# Patient Record
Sex: MALE | Race: WHITE | Employment: FULL TIME | URBAN - METROPOLITAN AREA
[De-identification: names, ages, dates, MRNs, and addresses within clinical notes are randomized per-mention and may not be internally consistent; named-entity substitution may affect disease eponyms.]

---

## 2024-10-18 ENCOUNTER — APPOINTMENT (OUTPATIENT)
Dept: CT IMAGING | Age: 54
End: 2024-10-18
Payer: COMMERCIAL

## 2024-10-18 ENCOUNTER — HOSPITAL ENCOUNTER (EMERGENCY)
Age: 54
Discharge: ANOTHER HOSPITAL | End: 2024-10-20
Attending: EMERGENCY MEDICINE
Payer: COMMERCIAL

## 2024-10-18 ENCOUNTER — APPOINTMENT (OUTPATIENT)
Dept: GENERAL RADIOLOGY | Age: 54
End: 2024-10-18
Payer: COMMERCIAL

## 2024-10-18 DIAGNOSIS — I63.9 CEREBROVASCULAR ACCIDENT (CVA), UNSPECIFIED MECHANISM (HCC): Primary | ICD-10-CM

## 2024-10-18 PROBLEM — F17.200 SMOKER: Status: ACTIVE | Noted: 2024-10-18

## 2024-10-18 LAB
ANION GAP SERPL CALCULATED.3IONS-SCNC: 9 MMOL/L (ref 7–16)
BASOPHILS # BLD: 0.11 K/UL (ref 0–0.2)
BASOPHILS NFR BLD: 1 % (ref 0–2)
BUN SERPL-MCNC: 14 MG/DL (ref 6–20)
CALCIUM SERPL-MCNC: 9.7 MG/DL (ref 8.6–10.2)
CHLORIDE SERPL-SCNC: 93 MMOL/L (ref 98–107)
CO2 SERPL-SCNC: 25 MMOL/L (ref 22–29)
CREAT SERPL-MCNC: 1 MG/DL (ref 0.7–1.2)
EOSINOPHIL # BLD: 0.43 K/UL (ref 0.05–0.5)
EOSINOPHILS RELATIVE PERCENT: 3 % (ref 0–6)
ERYTHROCYTE [DISTWIDTH] IN BLOOD BY AUTOMATED COUNT: 12.6 % (ref 11.5–15)
GFR, ESTIMATED: >90 ML/MIN/1.73M2
GLUCOSE BLD-MCNC: 119 MG/DL (ref 74–99)
GLUCOSE SERPL-MCNC: 108 MG/DL (ref 74–99)
HCT VFR BLD AUTO: 40.8 % (ref 37–54)
HGB BLD-MCNC: 13.8 G/DL (ref 12.5–16.5)
IMM GRANULOCYTES # BLD AUTO: 0.05 K/UL (ref 0–0.58)
IMM GRANULOCYTES NFR BLD: 0 % (ref 0–5)
INR PPP: 1
LYMPHOCYTES NFR BLD: 4.7 K/UL (ref 1.5–4)
LYMPHOCYTES RELATIVE PERCENT: 36 % (ref 20–42)
MCH RBC QN AUTO: 30.1 PG (ref 26–35)
MCHC RBC AUTO-ENTMCNC: 33.8 G/DL (ref 32–34.5)
MCV RBC AUTO: 89.1 FL (ref 80–99.9)
MONOCYTES NFR BLD: 0.94 K/UL (ref 0.1–0.95)
MONOCYTES NFR BLD: 7 % (ref 2–12)
NEUTROPHILS NFR BLD: 52 % (ref 43–80)
NEUTS SEG NFR BLD: 6.71 K/UL (ref 1.8–7.3)
PLATELET # BLD AUTO: 288 K/UL (ref 130–450)
PMV BLD AUTO: 9.4 FL (ref 7–12)
POTASSIUM SERPL-SCNC: 3.8 MMOL/L (ref 3.5–5)
PROTHROMBIN TIME: 10.5 SEC (ref 9.3–12.4)
RBC # BLD AUTO: 4.58 M/UL (ref 3.8–5.8)
SODIUM SERPL-SCNC: 127 MMOL/L (ref 132–146)
TROPONIN I SERPL HS-MCNC: 7 NG/L (ref 0–11)
WBC OTHER # BLD: 12.9 K/UL (ref 4.5–11.5)

## 2024-10-18 PROCEDURE — 82962 GLUCOSE BLOOD TEST: CPT

## 2024-10-18 PROCEDURE — 0042T CT BRAIN PERFUSION: CPT

## 2024-10-18 PROCEDURE — 93005 ELECTROCARDIOGRAM TRACING: CPT | Performed by: EMERGENCY MEDICINE

## 2024-10-18 PROCEDURE — 99285 EMERGENCY DEPT VISIT HI MDM: CPT

## 2024-10-18 PROCEDURE — 70450 CT HEAD/BRAIN W/O DYE: CPT

## 2024-10-18 PROCEDURE — 6360000002 HC RX W HCPCS: Performed by: EMERGENCY MEDICINE

## 2024-10-18 PROCEDURE — 70498 CT ANGIOGRAPHY NECK: CPT

## 2024-10-18 PROCEDURE — 85025 COMPLETE CBC W/AUTO DIFF WBC: CPT

## 2024-10-18 PROCEDURE — 84484 ASSAY OF TROPONIN QUANT: CPT

## 2024-10-18 PROCEDURE — 6360000004 HC RX CONTRAST MEDICATION: Performed by: RADIOLOGY

## 2024-10-18 PROCEDURE — 80048 BASIC METABOLIC PNL TOTAL CA: CPT

## 2024-10-18 PROCEDURE — 85610 PROTHROMBIN TIME: CPT

## 2024-10-18 PROCEDURE — 99449 NTRPROF PH1/NTRNET/EHR 31/>: CPT | Performed by: PSYCHIATRY & NEUROLOGY

## 2024-10-18 PROCEDURE — 2580000003 HC RX 258: Performed by: EMERGENCY MEDICINE

## 2024-10-18 PROCEDURE — 37195 THROMBOLYTIC THERAPY STROKE: CPT

## 2024-10-18 PROCEDURE — 2580000003 HC RX 258

## 2024-10-18 PROCEDURE — 71045 X-RAY EXAM CHEST 1 VIEW: CPT

## 2024-10-18 PROCEDURE — 6360000002 HC RX W HCPCS

## 2024-10-18 PROCEDURE — 70496 CT ANGIOGRAPHY HEAD: CPT

## 2024-10-18 RX ORDER — SODIUM CHLORIDE 9 MG/ML
INJECTION, SOLUTION INTRAVENOUS PRN
Status: DISCONTINUED | OUTPATIENT
Start: 2024-10-18 | End: 2024-10-20 | Stop reason: HOSPADM

## 2024-10-18 RX ORDER — HYDRALAZINE HYDROCHLORIDE 20 MG/ML
10 INJECTION INTRAMUSCULAR; INTRAVENOUS EVERY 30 MIN PRN
Status: DISCONTINUED | OUTPATIENT
Start: 2024-10-18 | End: 2024-10-20 | Stop reason: HOSPADM

## 2024-10-18 RX ORDER — POTASSIUM CHLORIDE 7.45 MG/ML
10 INJECTION INTRAVENOUS PRN
Status: CANCELLED | OUTPATIENT
Start: 2024-10-18 | End: 2024-10-25

## 2024-10-18 RX ORDER — ACETAMINOPHEN 325 MG/1
650 TABLET ORAL EVERY 6 HOURS PRN
Status: CANCELLED | OUTPATIENT
Start: 2024-10-18

## 2024-10-18 RX ORDER — SODIUM CHLORIDE 0.9 % (FLUSH) 0.9 %
10 SYRINGE (ML) INJECTION EVERY 12 HOURS SCHEDULED
Status: CANCELLED | OUTPATIENT
Start: 2024-10-18

## 2024-10-18 RX ORDER — ONDANSETRON 4 MG/1
4 TABLET, ORALLY DISINTEGRATING ORAL EVERY 8 HOURS PRN
Status: CANCELLED | OUTPATIENT
Start: 2024-10-18

## 2024-10-18 RX ORDER — IOPAMIDOL 755 MG/ML
100 INJECTION, SOLUTION INTRAVASCULAR
Status: COMPLETED | OUTPATIENT
Start: 2024-10-18 | End: 2024-10-18

## 2024-10-18 RX ORDER — ONDANSETRON 2 MG/ML
4 INJECTION INTRAMUSCULAR; INTRAVENOUS EVERY 6 HOURS PRN
Status: CANCELLED | OUTPATIENT
Start: 2024-10-18

## 2024-10-18 RX ORDER — LORATADINE 10 MG/1
10 TABLET, ORALLY DISINTEGRATING ORAL DAILY
COMMUNITY

## 2024-10-18 RX ORDER — LABETALOL HYDROCHLORIDE 5 MG/ML
10 INJECTION, SOLUTION INTRAVENOUS EVERY 10 MIN PRN
Status: DISCONTINUED | OUTPATIENT
Start: 2024-10-18 | End: 2024-10-20 | Stop reason: HOSPADM

## 2024-10-18 RX ORDER — ACETAMINOPHEN 650 MG/1
650 SUPPOSITORY RECTAL EVERY 6 HOURS PRN
Status: CANCELLED | OUTPATIENT
Start: 2024-10-18

## 2024-10-18 RX ORDER — SODIUM CHLORIDE 0.9 % (FLUSH) 0.9 %
10 SYRINGE (ML) INJECTION PRN
Status: CANCELLED | OUTPATIENT
Start: 2024-10-18

## 2024-10-18 RX ORDER — SODIUM CHLORIDE 0.9 % (FLUSH) 0.9 %
5-40 SYRINGE (ML) INJECTION PRN
Status: DISCONTINUED | OUTPATIENT
Start: 2024-10-18 | End: 2024-10-20 | Stop reason: HOSPADM

## 2024-10-18 RX ORDER — NICOTINE 21 MG/24HR
1 PATCH, TRANSDERMAL 24 HOURS TRANSDERMAL DAILY
Status: CANCELLED | OUTPATIENT
Start: 2024-10-19

## 2024-10-18 RX ORDER — SODIUM CHLORIDE 0.9 % (FLUSH) 0.9 %
10 SYRINGE (ML) INJECTION ONCE
Status: COMPLETED | OUTPATIENT
Start: 2024-10-18 | End: 2024-10-18

## 2024-10-18 RX ORDER — MAGNESIUM SULFATE IN WATER 40 MG/ML
2000 INJECTION, SOLUTION INTRAVENOUS PRN
Status: CANCELLED | OUTPATIENT
Start: 2024-10-18

## 2024-10-18 RX ORDER — SODIUM CHLORIDE 9 MG/ML
INJECTION, SOLUTION INTRAVENOUS PRN
Status: CANCELLED | OUTPATIENT
Start: 2024-10-18

## 2024-10-18 RX ORDER — SENNOSIDES A AND B 8.6 MG/1
1 TABLET, FILM COATED ORAL DAILY PRN
Status: CANCELLED | OUTPATIENT
Start: 2024-10-18

## 2024-10-18 RX ORDER — POTASSIUM CHLORIDE 1500 MG/1
40 TABLET, EXTENDED RELEASE ORAL PRN
Status: CANCELLED | OUTPATIENT
Start: 2024-10-18 | End: 2024-10-25

## 2024-10-18 RX ORDER — SODIUM CHLORIDE 0.9 % (FLUSH) 0.9 %
5-40 SYRINGE (ML) INJECTION EVERY 12 HOURS SCHEDULED
Status: DISCONTINUED | OUTPATIENT
Start: 2024-10-18 | End: 2024-10-20 | Stop reason: HOSPADM

## 2024-10-18 RX ADMIN — SODIUM CHLORIDE, PRESERVATIVE FREE 10 ML: 5 INJECTION INTRAVENOUS at 22:09

## 2024-10-18 RX ADMIN — SODIUM CHLORIDE, PRESERVATIVE FREE 10 ML: 5 INJECTION INTRAVENOUS at 22:13

## 2024-10-18 RX ADMIN — IOPAMIDOL 100 ML: 755 INJECTION, SOLUTION INTRAVENOUS at 21:35

## 2024-10-18 RX ADMIN — Medication 17 MG: at 22:11

## 2024-10-18 ASSESSMENT — PAIN - FUNCTIONAL ASSESSMENT: PAIN_FUNCTIONAL_ASSESSMENT: NONE - DENIES PAIN

## 2024-10-18 ASSESSMENT — PAIN SCALES - GENERAL: PAINLEVEL_OUTOF10: 0

## 2024-10-19 ENCOUNTER — APPOINTMENT (OUTPATIENT)
Dept: MRI IMAGING | Age: 54
End: 2024-10-19
Payer: COMMERCIAL

## 2024-10-19 PROCEDURE — 70551 MRI BRAIN STEM W/O DYE: CPT

## 2024-10-19 NOTE — CARE COORDINATION
Internal Medicine On-call Care Coordination Note    I was called by the ED physician because they recommended admission for this patient and we cover their PCP.  The history as I understand it after discussion with the ED physician is as follows:    LNK 8:30 pm  R sided weakness, axaxia  NIH 5  Telestroke  Smoker  Giving TNK    I placed admission orders.  Including:    General admission orders  NPO  Neurology consult  Nicotine patch  Further medication/ anticoagulant orders per neuro appreciated    Dr. Garay, or our coverage will see the patient tomorrow for H&P.    Electronically signed by FRIEDA Damico CNP on 10/18/2024 at 10:11 PM

## 2024-10-19 NOTE — VIRTUAL HEALTH
Stanford Saleh, was evaluated through a synchronous (real-time) audio-video encounter. The patient (and/or guardian if applicable) is aware that this is a billable service, which includes applicable co-pays. This virtual visit was conducted with patient's (and/or legal guardian's) consent. Patient identification was verified, and a caregiver was present when appropriate.  The patient was located at Facility (Appt Department): [facility] EMERGENCY DEPARTMENT  [address] [address]  Loc: [phone]  The provider was located at Home (City/State): [place]  Confirm you are appropriately licensed, registered, or certified to deliver care in the state where the patient is located as indicated above. If you are not or unsure, please re-schedule the visit: Yes, I confirm.   Consults       [facility] Stroke and Telestroke Consult for  [facility] Stroke Alert through [employer] @ 2128  10/18/2024 11:52 PM    Pt Name: Stanford Saleh  MRN: [id number]  YOB: 1970  Date of evaluation: 10/18/2024  Primary Care Physician: Viktor Camejo III, DO  Reason for Evaluation: Stroke evaluation with Phone Consult, Discussion and Review of imaging    Stanford Saleh is a 54 y.o. male who presents with right sided weakness. Complaint of dysarthria, R facial weakness as well as UE and LE weakness.     LKW: 2050  NIH:  5    Allergies  has No Known Allergies.  Medications  Prior to Admission medications    Medication Sig Start Date End Date Taking? Authorizing Provider   loratadine (ALAVERT) 10 MG dissolvable tablet Take 1 tablet by mouth daily   Yes Provider, MD Denise    Scheduled Meds:   sodium chloride flush  5-40 mL IntraVENous 2 times per day     Continuous Infusions:   sodium chloride       PRN Meds:.sodium chloride flush, sodium chloride, dextrose bolus, labetalol, hydrALAZINE  Past Medical History   has no

## 2024-10-19 NOTE — ED PROVIDER NOTES
[facility] EMERGENCY DEPARTMENT  EMERGENCY DEPARTMENT ENCOUNTER        Pt Name: Stanford Saleh  MRN: [id number]  Birthdate 1970  Date of evaluation: 10/18/2024  Provider: Sonia Morocho DO  PCP: Viktor Camejo III, DO  Note Started: 9:24 PM EDT 10/18/24    CHIEF COMPLAINT       No chief complaint on file.      HISTORY OF PRESENT ILLNESS: 1 or more Elements       Stanford Saleh is a 54 y.o. male who presents to the emergency department with a chief complaint of strokelike symptoms.  The patient states that he began suddenly approximately 8:30 PM with right arm and leg weakness as well as dysarthria.  Symptoms are moderate severity.  Nothing makes better.  Nothing makes it worse.  Patient not tried any treatments prior to arrival.  Has no medical problems.  No history intracranial hemorrhage.  Nothing on blood thinners.  He is a smoker.    Nursing Notes were all reviewed and agreed with or any disagreements were addressed in the HPI.    REVIEW OF SYSTEMS :      Review of Systems   Constitutional:  Negative for fever.   HENT:  Negative for congestion.    Eyes:  Negative for redness.   Respiratory:  Negative for shortness of breath.    Cardiovascular:  Negative for chest pain.   Gastrointestinal:  Negative for abdominal pain, nausea and vomiting.   Genitourinary:  Negative for dysuria.   Musculoskeletal:  Negative for arthralgias.   Skin:  Negative for rash.   Neurological:  Positive for weakness. Negative for dizziness and numbness.   Psychiatric/Behavioral:  Negative for confusion.    All other systems reviewed and are negative.      Positives and Pertinent negatives as per HPI.     SURGICAL HISTORY   No past surgical history on file.    CURRENTMEDICATIONS       Previous Medications    LORATADINE (ALAVERT) 10 MG DISSOLVABLE TABLET    Take 1 tablet by mouth daily       ALLERGIES     Patient has no known allergies.    FAMILYHISTORY     No family history on file.

## 2024-10-20 ENCOUNTER — HOSPITAL ENCOUNTER (INPATIENT)
Age: 54
LOS: 1 days | Discharge: HOME | DRG: 065 | End: 2024-10-21
Attending: STUDENT IN AN ORGANIZED HEALTH CARE EDUCATION/TRAINING PROGRAM | Admitting: STUDENT IN AN ORGANIZED HEALTH CARE EDUCATION/TRAINING PROGRAM
Payer: COMMERCIAL

## 2024-10-20 VITALS
HEIGHT: 66 IN | RESPIRATION RATE: 10 BRPM | DIASTOLIC BLOOD PRESSURE: 77 MMHG | WEIGHT: 150 LBS | TEMPERATURE: 97.3 F | OXYGEN SATURATION: 97 % | SYSTOLIC BLOOD PRESSURE: 120 MMHG | HEART RATE: 65 BPM | BODY MASS INDEX: 24.11 KG/M2

## 2024-10-20 DIAGNOSIS — I63.9 CEREBROVASCULAR ACCIDENT (CVA), UNSPECIFIED MECHANISM (HCC): Primary | ICD-10-CM

## 2024-10-20 LAB
ALBUMIN SERPL-MCNC: 4.2 G/DL (ref 3.5–5.2)
ALP SERPL-CCNC: 89 U/L (ref 40–129)
ALT SERPL-CCNC: 16 U/L (ref 0–40)
ANION GAP SERPL CALCULATED.3IONS-SCNC: 11 MMOL/L (ref 7–16)
AST SERPL-CCNC: 15 U/L (ref 0–39)
BASOPHILS # BLD: 0.09 K/UL (ref 0–0.2)
BASOPHILS NFR BLD: 1 % (ref 0–2)
BILIRUB SERPL-MCNC: 0.2 MG/DL (ref 0–1.2)
BUN SERPL-MCNC: 13 MG/DL (ref 6–20)
CALCIUM SERPL-MCNC: 8.7 MG/DL (ref 8.6–10.2)
CHLORIDE SERPL-SCNC: 101 MMOL/L (ref 98–107)
CHOLEST SERPL-MCNC: 236 MG/DL
CO2 SERPL-SCNC: 23 MMOL/L (ref 22–29)
CREAT SERPL-MCNC: 0.9 MG/DL (ref 0.7–1.2)
EOSINOPHIL # BLD: 0.46 K/UL (ref 0.05–0.5)
EOSINOPHILS RELATIVE PERCENT: 4 % (ref 0–6)
ERYTHROCYTE [DISTWIDTH] IN BLOOD BY AUTOMATED COUNT: 13.1 % (ref 11.5–15)
GFR, ESTIMATED: >90 ML/MIN/1.73M2
GLUCOSE SERPL-MCNC: 104 MG/DL (ref 74–99)
HBA1C MFR BLD: 5.6 % (ref 4–5.6)
HCT VFR BLD AUTO: 41.7 % (ref 37–54)
HDLC SERPL-MCNC: 43 MG/DL
HGB BLD-MCNC: 13.9 G/DL (ref 12.5–16.5)
IMM GRANULOCYTES # BLD AUTO: 0.04 K/UL (ref 0–0.58)
IMM GRANULOCYTES NFR BLD: 0 % (ref 0–5)
LDLC SERPL CALC-MCNC: 156 MG/DL
LYMPHOCYTES NFR BLD: 2.65 K/UL (ref 1.5–4)
LYMPHOCYTES RELATIVE PERCENT: 24 % (ref 20–42)
MCH RBC QN AUTO: 30.5 PG (ref 26–35)
MCHC RBC AUTO-ENTMCNC: 33.3 G/DL (ref 32–34.5)
MCV RBC AUTO: 91.6 FL (ref 80–99.9)
MONOCYTES NFR BLD: 0.92 K/UL (ref 0.1–0.95)
MONOCYTES NFR BLD: 8 % (ref 2–12)
NEUTROPHILS NFR BLD: 62 % (ref 43–80)
NEUTS SEG NFR BLD: 6.89 K/UL (ref 1.8–7.3)
OSMOLALITY SERPL: 285 MOSM/KG (ref 285–310)
OSMOLALITY UR: 513 MOSM/KG (ref 300–900)
PLATELET # BLD AUTO: 277 K/UL (ref 130–450)
PMV BLD AUTO: 10.5 FL (ref 7–12)
POTASSIUM SERPL-SCNC: 4.3 MMOL/L (ref 3.5–5)
PROT SERPL-MCNC: 6.3 G/DL (ref 6.4–8.3)
RBC # BLD AUTO: 4.55 M/UL (ref 3.8–5.8)
SODIUM SERPL-SCNC: 135 MMOL/L (ref 132–146)
SODIUM UR-SCNC: 74 MMOL/L
TRIGL SERPL-MCNC: 186 MG/DL
VLDLC SERPL CALC-MCNC: 37 MG/DL
WBC OTHER # BLD: 11.1 K/UL (ref 4.5–11.5)

## 2024-10-20 PROCEDURE — 2060000000 HC ICU INTERMEDIATE R&B

## 2024-10-20 PROCEDURE — 6370000000 HC RX 637 (ALT 250 FOR IP): Performed by: INTERNAL MEDICINE

## 2024-10-20 PROCEDURE — 80061 LIPID PANEL: CPT

## 2024-10-20 PROCEDURE — G0378 HOSPITAL OBSERVATION PER HR: HCPCS

## 2024-10-20 PROCEDURE — 36415 COLL VENOUS BLD VENIPUNCTURE: CPT

## 2024-10-20 PROCEDURE — 83935 ASSAY OF URINE OSMOLALITY: CPT

## 2024-10-20 PROCEDURE — 80053 COMPREHEN METABOLIC PANEL: CPT

## 2024-10-20 PROCEDURE — 83036 HEMOGLOBIN GLYCOSYLATED A1C: CPT

## 2024-10-20 PROCEDURE — 85025 COMPLETE CBC W/AUTO DIFF WBC: CPT

## 2024-10-20 PROCEDURE — 2580000003 HC RX 258: Performed by: NURSE PRACTITIONER

## 2024-10-20 PROCEDURE — G0379 DIRECT REFER HOSPITAL OBSERV: HCPCS

## 2024-10-20 PROCEDURE — 83930 ASSAY OF BLOOD OSMOLALITY: CPT

## 2024-10-20 PROCEDURE — 84300 ASSAY OF URINE SODIUM: CPT

## 2024-10-20 PROCEDURE — 99222 1ST HOSP IP/OBS MODERATE 55: CPT | Performed by: PSYCHIATRY & NEUROLOGY

## 2024-10-20 PROCEDURE — 97165 OT EVAL LOW COMPLEX 30 MIN: CPT

## 2024-10-20 PROCEDURE — 97535 SELF CARE MNGMENT TRAINING: CPT

## 2024-10-20 PROCEDURE — 97161 PT EVAL LOW COMPLEX 20 MIN: CPT

## 2024-10-20 RX ORDER — NICOTINE 21 MG/24HR
1 PATCH, TRANSDERMAL 24 HOURS TRANSDERMAL DAILY
Status: DISCONTINUED | OUTPATIENT
Start: 2024-10-20 | End: 2024-10-21 | Stop reason: HOSPADM

## 2024-10-20 RX ORDER — MAGNESIUM SULFATE IN WATER 40 MG/ML
2000 INJECTION, SOLUTION INTRAVENOUS PRN
Status: DISCONTINUED | OUTPATIENT
Start: 2024-10-20 | End: 2024-10-21 | Stop reason: HOSPADM

## 2024-10-20 RX ORDER — POTASSIUM CHLORIDE 7.45 MG/ML
10 INJECTION INTRAVENOUS PRN
Status: DISCONTINUED | OUTPATIENT
Start: 2024-10-20 | End: 2024-10-21 | Stop reason: HOSPADM

## 2024-10-20 RX ORDER — ONDANSETRON 4 MG/1
4 TABLET, ORALLY DISINTEGRATING ORAL EVERY 8 HOURS PRN
Status: DISCONTINUED | OUTPATIENT
Start: 2024-10-20 | End: 2024-10-21 | Stop reason: HOSPADM

## 2024-10-20 RX ORDER — ONDANSETRON 2 MG/ML
4 INJECTION INTRAMUSCULAR; INTRAVENOUS EVERY 6 HOURS PRN
Status: DISCONTINUED | OUTPATIENT
Start: 2024-10-20 | End: 2024-10-21 | Stop reason: HOSPADM

## 2024-10-20 RX ORDER — SODIUM CHLORIDE 0.9 % (FLUSH) 0.9 %
10 SYRINGE (ML) INJECTION PRN
Status: DISCONTINUED | OUTPATIENT
Start: 2024-10-20 | End: 2024-10-21 | Stop reason: HOSPADM

## 2024-10-20 RX ORDER — SODIUM CHLORIDE 9 MG/ML
INJECTION, SOLUTION INTRAVENOUS PRN
Status: DISCONTINUED | OUTPATIENT
Start: 2024-10-20 | End: 2024-10-21 | Stop reason: HOSPADM

## 2024-10-20 RX ORDER — POTASSIUM CHLORIDE 1500 MG/1
40 TABLET, EXTENDED RELEASE ORAL PRN
Status: DISCONTINUED | OUTPATIENT
Start: 2024-10-20 | End: 2024-10-21 | Stop reason: HOSPADM

## 2024-10-20 RX ORDER — ASPIRIN 81 MG/1
81 TABLET, CHEWABLE ORAL DAILY
Status: DISCONTINUED | OUTPATIENT
Start: 2024-10-20 | End: 2024-10-21 | Stop reason: HOSPADM

## 2024-10-20 RX ORDER — SODIUM CHLORIDE 0.9 % (FLUSH) 0.9 %
10 SYRINGE (ML) INJECTION EVERY 12 HOURS SCHEDULED
Status: DISCONTINUED | OUTPATIENT
Start: 2024-10-20 | End: 2024-10-21 | Stop reason: HOSPADM

## 2024-10-20 RX ORDER — ATORVASTATIN CALCIUM 40 MG/1
40 TABLET, FILM COATED ORAL NIGHTLY
Status: DISCONTINUED | OUTPATIENT
Start: 2024-10-20 | End: 2024-10-21 | Stop reason: HOSPADM

## 2024-10-20 RX ORDER — ACETAMINOPHEN 325 MG/1
650 TABLET ORAL EVERY 6 HOURS PRN
Status: DISCONTINUED | OUTPATIENT
Start: 2024-10-20 | End: 2024-10-21 | Stop reason: HOSPADM

## 2024-10-20 RX ORDER — ACETAMINOPHEN 650 MG/1
650 SUPPOSITORY RECTAL EVERY 6 HOURS PRN
Status: DISCONTINUED | OUTPATIENT
Start: 2024-10-20 | End: 2024-10-21 | Stop reason: HOSPADM

## 2024-10-20 RX ORDER — SENNOSIDES A AND B 8.6 MG/1
1 TABLET, FILM COATED ORAL DAILY PRN
Status: DISCONTINUED | OUTPATIENT
Start: 2024-10-20 | End: 2024-10-21 | Stop reason: HOSPADM

## 2024-10-20 RX ADMIN — SODIUM CHLORIDE, PRESERVATIVE FREE 10 ML: 5 INJECTION INTRAVENOUS at 20:34

## 2024-10-20 RX ADMIN — SODIUM CHLORIDE, PRESERVATIVE FREE 10 ML: 5 INJECTION INTRAVENOUS at 09:12

## 2024-10-20 RX ADMIN — ATORVASTATIN CALCIUM 40 MG: 40 TABLET, FILM COATED ORAL at 20:34

## 2024-10-20 RX ADMIN — ASPIRIN 81 MG 81 MG: 81 TABLET ORAL at 09:12

## 2024-10-20 NOTE — H&P
History & Physicial  Stanford Saleh  [id number]  1970  10/20/24  Primary Care:  Viktor Camejo III, DO  [address] / [place] [address]        CC: right sided weakness.     HPI:  Patient is a 54 year old male who presented to [facility] due to right sided weakness that started around 830 pm on 10/18/24. Right sided facial weakness as well as upper extremity and lower extremity. He was evaluated in ER. He had CT scan of head which was negative for bleed. He was evaluated by Telestroke neurology and recommended to have TNK. He was given this at 1011 pm on 10/18/24. Patient had improvement in his NIH from 5 to 0 after given. He had follow up MRI last evening. He was transferred to [facility] for inpatient neurology consultation. Patient has a history of HLD for which he was on atorvastatin through Dr. Camejo as well as hypertension but was monitoring off of medication. Patient does smoke.     Prior to Visit Medications    Medication Sig Taking? Authorizing Provider   loratadine (ALAVERT) 10 MG dissolvable tablet Take 1 tablet by mouth daily  Provider, MD Denise        Family history: Father cancer, Mother thyroid   Surgical history: Non contributory   PMH: HLD was on lipitor in 2023.   Hypertension      Social History:   Tobacco abuse   Drinks 2-3 drinks per month  No illicit drug use.     Review of Systems   Constitutional:  Positive for activity change. Negative for chills and fever.   HENT:  Negative for congestion, ear discharge and ear pain.    Eyes:  Negative for photophobia and visual disturbance.   Respiratory:  Negative for cough, shortness of breath and wheezing.    Cardiovascular:  Negative for chest pain, palpitations and leg swelling.   Gastrointestinal:  Negative for abdominal pain, constipation, diarrhea, nausea and vomiting.   Endocrine: Negative for polydipsia, polyphagia and polyuria.   Genitourinary:  Negative for dysuria, frequency and

## 2024-10-20 NOTE — CONSULTS
[facility]  Neurology Consult    Date:  10/20/2024  Patient Name:  Stanford Saleh  YOB: 1970  MRN: [id number]     PCP:  Viktor Camejo III, DO   Referring:  Sonia Morocho DO      Chief Complaint: right sided weakness    History obtained from: patient, son, chart    Assessment  Stanford Saleh is a 54 y.o. male presenting with right-sided weakness and numbness which resolved s/p TNK.  His risk factors include hypertension, hyperlipidemia, and smoking.  There is a possible cavernoma noted in the left basal ganglia which does not appear to have any associated acute blood on image.      Plan  Continue aspirin 81 mg daily  High intensity statin therapy with a goal LDL<70  May need to change Lipitor to alternative therapy such as Crestor given history of myalgias  Continue risk factor modification for secondary stroke prevention with appropriate BP and glucose control  Echocardiogram pending  Discussed smoking cessation  Recommend outpatient sleep study for possible SCOTT        History of Present Illness:  Stanford Saleh is a 54 y.o. right handed male presenting for evaluation of stroke.  The patient had presented to [facility] on October 18 after developing weakness in his right arm and leg with a \"tingly\" feeling in the limbs as well.  He describes his right arm as \"nearly paralyzed.\"  He also reports slurred speech at this time.  The symptoms have been going on for 30 to 60 minutes before his wife called EMS and he was taken to the ED.  He was given TNK with resolution of symptoms.    His son also reports that the family has concerns he may have undiagnosed sleep apnea.      Medical History:   Hyperlipidemia  Myalgias from Lipitor      Social History:  Smoking    Current Medications:      Current Facility-Administered Medications   Medication Dose Route Frequency Provider Last Rate Last Admin    sodium chloride flush 0.9 % injection 10 mL  10 mL IntraVENous 2

## 2024-10-21 ENCOUNTER — APPOINTMENT (OUTPATIENT)
Age: 54
DRG: 065 | End: 2024-10-21
Attending: INTERNAL MEDICINE
Payer: COMMERCIAL

## 2024-10-21 VITALS
TEMPERATURE: 97.5 F | HEIGHT: 66 IN | OXYGEN SATURATION: 99 % | SYSTOLIC BLOOD PRESSURE: 125 MMHG | WEIGHT: 150 LBS | HEART RATE: 59 BPM | RESPIRATION RATE: 16 BRPM | BODY MASS INDEX: 24.11 KG/M2 | DIASTOLIC BLOOD PRESSURE: 79 MMHG

## 2024-10-21 LAB
ALBUMIN SERPL-MCNC: 4.3 G/DL (ref 3.5–5.2)
ALP SERPL-CCNC: 84 U/L (ref 40–129)
ALT SERPL-CCNC: 17 U/L (ref 0–40)
ANION GAP SERPL CALCULATED.3IONS-SCNC: 8 MMOL/L (ref 7–16)
AST SERPL-CCNC: 16 U/L (ref 0–39)
BASOPHILS # BLD: 0.07 K/UL (ref 0–0.2)
BASOPHILS NFR BLD: 1 % (ref 0–2)
BILIRUB SERPL-MCNC: 0.2 MG/DL (ref 0–1.2)
BUN SERPL-MCNC: 10 MG/DL (ref 6–20)
CALCIUM SERPL-MCNC: 9.1 MG/DL (ref 8.6–10.2)
CHLORIDE SERPL-SCNC: 104 MMOL/L (ref 98–107)
CO2 SERPL-SCNC: 24 MMOL/L (ref 22–29)
CREAT SERPL-MCNC: 0.9 MG/DL (ref 0.7–1.2)
ECHO AO ASC DIAM: 3.4 CM
ECHO AO ASCENDING AORTA INDEX: 1.92 CM/M2
ECHO AV AREA PEAK VELOCITY: 2.9 CM2
ECHO AV AREA VTI: 2.8 CM2
ECHO AV AREA/BSA PEAK VELOCITY: 1.6 CM2/M2
ECHO AV AREA/BSA VTI: 1.6 CM2/M2
ECHO AV CUSP MM: 2.3 CM
ECHO AV MEAN GRADIENT: 6 MMHG
ECHO AV MEAN VELOCITY: 1.2 M/S
ECHO AV PEAK GRADIENT: 12 MMHG
ECHO AV PEAK VELOCITY: 1.8 M/S
ECHO AV VELOCITY RATIO: 0.89
ECHO AV VTI: 32.6 CM
ECHO BSA: 1.78 M2
ECHO EST RA PRESSURE: 3 MMHG
ECHO LA DIAMETER INDEX: 1.75 CM/M2
ECHO LA DIAMETER: 3.1 CM
ECHO LA VOL A-L A2C: 47 ML (ref 18–58)
ECHO LA VOL A-L A4C: 45 ML (ref 18–58)
ECHO LA VOL BP: 42 ML (ref 18–58)
ECHO LA VOL MOD A2C: 45 ML (ref 18–58)
ECHO LA VOL MOD A4C: 39 ML (ref 18–58)
ECHO LA VOL/BSA BIPLANE: 24 ML/M2 (ref 16–34)
ECHO LA VOLUME AREA LENGTH: 47 ML
ECHO LA VOLUME INDEX A-L A2C: 27 ML/M2 (ref 16–34)
ECHO LA VOLUME INDEX A-L A4C: 25 ML/M2 (ref 16–34)
ECHO LA VOLUME INDEX AREA LENGTH: 27 ML/M2 (ref 16–34)
ECHO LA VOLUME INDEX MOD A2C: 25 ML/M2 (ref 16–34)
ECHO LA VOLUME INDEX MOD A4C: 22 ML/M2 (ref 16–34)
ECHO LV EF PHYSICIAN: 70 %
ECHO LV FRACTIONAL SHORTENING: 49 % (ref 28–44)
ECHO LV INTERNAL DIMENSION DIASTOLE INDEX: 2.66 CM/M2
ECHO LV INTERNAL DIMENSION DIASTOLIC: 4.7 CM (ref 4.2–5.9)
ECHO LV INTERNAL DIMENSION SYSTOLIC INDEX: 1.36 CM/M2
ECHO LV INTERNAL DIMENSION SYSTOLIC: 2.4 CM
ECHO LV ISOVOLUMETRIC RELAXATION TIME (IVRT): 95.2 MS
ECHO LV IVSD: 0.9 CM (ref 0.6–1)
ECHO LV MASS 2D: 132.3 G (ref 88–224)
ECHO LV MASS INDEX 2D: 74.8 G/M2 (ref 49–115)
ECHO LV POSTERIOR WALL DIASTOLIC: 0.8 CM (ref 0.6–1)
ECHO LV RELATIVE WALL THICKNESS RATIO: 0.34
ECHO LVOT AREA: 3.1 CM2
ECHO LVOT AV VTI INDEX: 0.85
ECHO LVOT DIAM: 2 CM
ECHO LVOT MEAN GRADIENT: 5 MMHG
ECHO LVOT PEAK GRADIENT: 10 MMHG
ECHO LVOT PEAK VELOCITY: 1.6 M/S
ECHO LVOT STROKE VOLUME INDEX: 49.3 ML/M2
ECHO LVOT SV: 87.3 ML
ECHO LVOT VTI: 27.8 CM
ECHO MV "A" WAVE DURATION: 102.8 MSEC
ECHO MV A VELOCITY: 0.82 M/S
ECHO MV AREA PHT: 3.9 CM2
ECHO MV AREA VTI: 3.6 CM2
ECHO MV E DECELERATION TIME (DT): 209.8 MS
ECHO MV E VELOCITY: 0.75 M/S
ECHO MV E/A RATIO: 0.91
ECHO MV LVOT VTI INDEX: 0.87
ECHO MV MAX VELOCITY: 1.1 M/S
ECHO MV MEAN GRADIENT: 2 MMHG
ECHO MV MEAN VELOCITY: 0.7 M/S
ECHO MV PEAK GRADIENT: 5 MMHG
ECHO MV PRESSURE HALF TIME (PHT): 56.7 MS
ECHO MV VTI: 24.3 CM
ECHO PV MAX VELOCITY: 1.1 M/S
ECHO PV MEAN GRADIENT: 3 MMHG
ECHO PV MEAN VELOCITY: 0.8 M/S
ECHO PV PEAK GRADIENT: 5 MMHG
ECHO PV VTI: 21.7 CM
ECHO RIGHT VENTRICULAR SYSTOLIC PRESSURE (RVSP): 24 MMHG
ECHO RV INTERNAL DIMENSION: 3.5 CM
ECHO RV LONGITUDINAL DIMENSION: 6.6 CM
ECHO RV MID DIMENSION: 1.8 CM
ECHO RV TAPSE: 2 CM (ref 1.7–?)
ECHO TV REGURGITANT MAX VELOCITY: 2.29 M/S
ECHO TV REGURGITANT PEAK GRADIENT: 21 MMHG
EKG ATRIAL RATE: 85 BPM
EKG P AXIS: 50 DEGREES
EKG P-R INTERVAL: 138 MS
EKG Q-T INTERVAL: 342 MS
EKG QRS DURATION: 92 MS
EKG QTC CALCULATION (BAZETT): 406 MS
EKG R AXIS: 28 DEGREES
EKG T AXIS: 30 DEGREES
EKG VENTRICULAR RATE: 85 BPM
EOSINOPHIL # BLD: 0.42 K/UL (ref 0.05–0.5)
EOSINOPHILS RELATIVE PERCENT: 3 % (ref 0–6)
ERYTHROCYTE [DISTWIDTH] IN BLOOD BY AUTOMATED COUNT: 12.7 % (ref 11.5–15)
GFR, ESTIMATED: >90 ML/MIN/1.73M2
GLUCOSE SERPL-MCNC: 90 MG/DL (ref 74–99)
HCT VFR BLD AUTO: 42.9 % (ref 37–54)
HGB BLD-MCNC: 14.2 G/DL (ref 12.5–16.5)
IMM GRANULOCYTES # BLD AUTO: 0.06 K/UL (ref 0–0.58)
IMM GRANULOCYTES NFR BLD: 1 % (ref 0–5)
LYMPHOCYTES NFR BLD: 2.8 K/UL (ref 1.5–4)
LYMPHOCYTES RELATIVE PERCENT: 22 % (ref 20–42)
MCH RBC QN AUTO: 30.3 PG (ref 26–35)
MCHC RBC AUTO-ENTMCNC: 33.1 G/DL (ref 32–34.5)
MCV RBC AUTO: 91.7 FL (ref 80–99.9)
MONOCYTES NFR BLD: 0.79 K/UL (ref 0.1–0.95)
MONOCYTES NFR BLD: 6 % (ref 2–12)
NEUTROPHILS NFR BLD: 67 % (ref 43–80)
NEUTS SEG NFR BLD: 8.42 K/UL (ref 1.8–7.3)
PLATELET # BLD AUTO: 278 K/UL (ref 130–450)
PMV BLD AUTO: 10.1 FL (ref 7–12)
POTASSIUM SERPL-SCNC: 4.9 MMOL/L (ref 3.5–5)
PROT SERPL-MCNC: 6.5 G/DL (ref 6.4–8.3)
RBC # BLD AUTO: 4.68 M/UL (ref 3.8–5.8)
SODIUM SERPL-SCNC: 136 MMOL/L (ref 132–146)
WBC OTHER # BLD: 12.6 K/UL (ref 4.5–11.5)

## 2024-10-21 PROCEDURE — 92523 SPEECH SOUND LANG COMPREHEN: CPT

## 2024-10-21 PROCEDURE — 93306 TTE W/DOPPLER COMPLETE: CPT

## 2024-10-21 PROCEDURE — 36415 COLL VENOUS BLD VENIPUNCTURE: CPT

## 2024-10-21 PROCEDURE — 85025 COMPLETE CBC W/AUTO DIFF WBC: CPT

## 2024-10-21 PROCEDURE — 93010 ELECTROCARDIOGRAM REPORT: CPT | Performed by: INTERNAL MEDICINE

## 2024-10-21 PROCEDURE — 80053 COMPREHEN METABOLIC PANEL: CPT

## 2024-10-21 PROCEDURE — 2580000003 HC RX 258: Performed by: NURSE PRACTITIONER

## 2024-10-21 PROCEDURE — 6370000000 HC RX 637 (ALT 250 FOR IP): Performed by: NURSE PRACTITIONER

## 2024-10-21 PROCEDURE — 6370000000 HC RX 637 (ALT 250 FOR IP): Performed by: INTERNAL MEDICINE

## 2024-10-21 PROCEDURE — 93306 TTE W/DOPPLER COMPLETE: CPT | Performed by: INTERNAL MEDICINE

## 2024-10-21 PROCEDURE — G0378 HOSPITAL OBSERVATION PER HR: HCPCS

## 2024-10-21 RX ORDER — NICOTINE 21 MG/24HR
1 PATCH, TRANSDERMAL 24 HOURS TRANSDERMAL DAILY
Qty: 30 PATCH | Refills: 3 | Status: SHIPPED | OUTPATIENT
Start: 2024-10-21

## 2024-10-21 RX ORDER — ATORVASTATIN CALCIUM 40 MG/1
40 TABLET, FILM COATED ORAL NIGHTLY
Qty: 30 TABLET | Refills: 3 | Status: SHIPPED | OUTPATIENT
Start: 2024-10-21

## 2024-10-21 RX ORDER — ASPIRIN 81 MG/1
81 TABLET, CHEWABLE ORAL DAILY
Qty: 30 TABLET | Refills: 3 | Status: SHIPPED | OUTPATIENT
Start: 2024-10-22

## 2024-10-21 RX ADMIN — ASPIRIN 81 MG 81 MG: 81 TABLET ORAL at 09:03

## 2024-10-21 RX ADMIN — SODIUM CHLORIDE, PRESERVATIVE FREE 10 ML: 5 INJECTION INTRAVENOUS at 09:04

## 2024-10-21 NOTE — CARE COORDINATION
Chart reviewed and case reviewed in IDR.  Patient received in transfer from [facility] after receiving TNK for right sided weakness and numbness.  Per Neurology, there is a possible cavernoma noted in the left basal ganglia which does not appear to have any associated acute blood on image.  Echo pending completion.  Met with the patient, his wife Jennifer, and daughter Alesha at the bedside to discuss transition of care planning.  Patient lives with his wife in a split level home with one step to enter and 6-7 steps to the multiple levels of the home.  Patient has no DME and no history of rehab.  Patient's PC is Dr Camejo and he uses [facility] on [address] in [place] for his medications.  Patient was independent with therapy and plans to return home when medically stable to do so.  Call placed to [facility] with Echo re: test being completed for discharge.  Patient's wife to provide transportation home.  Will continue to follow for further transition of care planning needs.       Sonia Rosa RN.  P:  [phone]      Case Management Assessment  Initial Evaluation    Date/Time of Evaluation: 10/21/2024 10:44 AM  Assessment Completed by: Sonia Rosa RN    If patient is discharged prior to next notation, then this note serves as note for discharge by case management.    Patient Name: Stanford Saleh                   YOB: 1970  Diagnosis: Acute CVA (cerebrovascular accident) (HCC) [I63.9]                   Date / Time: 10/20/2024  3:53 AM    Patient Admission Status: Inpatient   Readmission Risk (Low < 19, Mod (19-27), High > 27): Readmission Risk Score: 5    Current PCP: Viktor Camejo III, DO  PCP verified by CM? Yes (Viktor Camejo III, DO)    Chart Reviewed: Yes      History Provided by: Patient  Patient Orientation: Alert and Oriented    Patient Cognition: Alert    Hospitalization in the last 30 days (Readmission):  No    If yes, Readmission Assessment in CM Navigator will be

## 2024-10-21 NOTE — ACP (ADVANCE CARE PLANNING)
Advance Care Planning   Healthcare Decision Maker:    Primary Decision Maker: ZAIDAELENATERRI WHITFIELD - [facility] - [phone]    Click here to complete Healthcare Decision Makers including selection of the Healthcare Decision Maker Relationship (ie \"Primary\").                 Sonia Rosa RN     Infliximab Counseling:  I discussed with the patient the risks of infliximab including but not limited to myelosuppression, immunosuppression, autoimmune hepatitis, demyelinating diseases, lymphoma, and serious infections.  The patient understands that monitoring is required including a PPD at baseline and must alert us or the primary physician if symptoms of infection or other concerning signs are noted.

## 2024-10-21 NOTE — PROGRESS NOTES
4 Eyes Skin Assessment     NAME:  Stanford Saleh  YOB: 1970  MEDICAL RECORD NUMBER:  [id number]    The patient is being assessed for  Admission    I agree that at least one RN has performed a thorough Head to Toe Skin Assessment on the patient. ALL assessment sites listed below have been assessed.      Areas assessed by both nurses:    Head, Face, Ears, Shoulders, Back, Chest, Arms, Elbows, Hands, Sacrum. Buttock, Coccyx, Ischium, Legs. Feet and Heels, and Under Medical Devices         Does the Patient have a Wound? No noted wound(s)       Hermes Prevention initiated by RN: No  Wound Care Orders initiated by RN: No    Pressure Injury (Stage 3,4, Unstageable, DTI, NWPT, and Complex wounds) if present, place Wound referral order by RN under : No    New Ostomies, if present place, Ostomy referral order under : No     Nurse 1 eSignature: Electronically signed by Kika Caldwell RN on 10/20/24 at 4:41 AM EDT    **SHARE this note so that the co-signing nurse can place an eSignature**    Nurse 2 eSignature: Electronically signed by Bouchra Roldan RN on 10/20/24 at 4:41 AM EDT   
CLINICAL PHARMACY NOTE: MEDS TO BEDS    Total # of Prescriptions Filled: 3   The following medications were delivered to the patient:  Nicotine 14mg  Aspirin low dose  Atorvastatin 40 mg    Additional Documentation:     Picked up in the pharmacy by juan c (daughter)  
Message sent to Liliana PRESTON to see if pt diet can be advanced    Per Liliana PRESTON ok to advance as tolerated   
New consult sent to neurology   
Occupational Therapy  Facility/Department: [address] IMCU/NEURO  Occupational Therapy Initial Assessment    Name: Stanford Saleh  : 1970  MRN: [id number]  Date of Service: 10/20/2024  Room: 8516B    Evaluating OT: Coby Pollock OTR/L [id number]  Referring Provider: DO Damaris  Specific Provider Orders/Date: OT eval and treat (10/20/24)  Diagnosis: CVA- Ataxia- s/p TNK  Reason for admission: Pt admitted with acute CVA  Pertinent Medical History: +tobacco  Precautions:  Fall Risk    Assessment of current deficits   [] Functional mobility  []ADLs  [] Strength               []Cognition   [] Functional transfers   [] IADLs         [] Safety Awareness   []Endurance   [] Fine Coordination        [] ROM     [] Vision/perception   []Sensation    []Gross Motor Coordination [] Balance   [] Delirium                  []Motor Control     [] Communication      Modified [place] Scale   Score     Description  0             No symptoms  1             No significant disability despite symptoms  2             Slight disability; able to look after own affairs  3             Moderate disability; able to ambulate without assist/ requires assist with ADLs  4             Moderate/Severe disability;requires assist to ambulate/assist with ADLs  5             Severe disability;bedridden/incontinent   6               Score:   0    Recommended Adaptive Equipment: TBD    Home Living: Pt lives with his wife Rebecca & their 23 yo daughter in a split level home [address] no HR; 6-7 steps 1HR to living level & 5-6 steps 1HR to bed/bath  Bathroom setup: tub- curtain  Equipment owned: none    Prior Level of Function: Ind with ADLs;  Ind with IADLs. No AD for functional mobility, works as a .   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 They also have 2 dogs    Pain Level: pt c/o no pain this session    Cognition: A&O: 4/4; Follows 2-3 step commands with min cues   Memory: G   Comprehension: 
SPEECH/LANGUAGE PATHOLOGY  SPEECH/LANGUAGE/COGNITIVE EVALUATION   and PLAN OF CARE      PATIENT NAME:  Stanford Saleh  (male)     MRN:  [id number]    :  1970  (54 y.o.)  STATUS:  Inpatient: Room 8515/8515-B    TODAY'S DATE:  10/21/2024  ORDER DATE, DESCRIPTION AND REFERRING PROVIDER : Dr. Ocampo  REASON FOR REFERRAL:  Assess speech/language/cognition  EVALUATING THERAPIST: Odalys Wallace SLP    ADMITTING DIAGNOSIS: Acute CVA (cerebrovascular accident) ([facility]) [I63.9]    VISIT DIAGNOSIS:        SPEECH THERAPY  PLAN OF CARE   The speech therapy  POC is established based on physician order, speech pathology diagnosis and results of clinical assessment     SPEECH PATHOLOGY DIAGNOSIS:    Within functional limits    Speech Pathology intervention is not warranted at this time.     Conditions Requiring Skilled Therapeutic Intervention for speech, language and/or cognition    Not applicable     Specific Speech Therapy Interventions to Include:   Not applicable    Specific instructions for next treatment:     not applicable    SHORT/LONG TERM GOALS  Not applicable.   Therapy is not recommended    Patient goals: Patient/family involved in developing goals and treatment plan:   Treatment goals discussed with Patient and Family    The Patient and Family understand(s) the diagnosis, prognosis and plan of care   The patient/family Agreed with above,     This plan may be re-evaluated and revised as warranted.        Rehabilitation Potential/Prognosis: good                CLINICAL ASSESSMENT:  MOTOR SPEECH       Oral Peripheral Examination   Adequate lingual/labial strength     Parameters of Speech Production  Respiration:  Adequate for speech production  Articulation:  Within functional limits  Resonance:  Within functional limits  Quality:   Within functional limits  Pitch:    Within functional limits  Intensity: Within functional limits  Fluency:  Intact  Prosody Intact    Speech Intelligibility      Good given unstructured 
1216    Total Treatment Time  0 minutes     Evaluation Time includes thorough review of current medical information, gathering information on past medical history/social history and prior level of function, completion of standardized testing/informal observation of tasks, assessment of data and education on plan of care and goals.    CPT codes:  [x] Low Complexity PT evaluation 14045  [] Moderate Complexity PT evaluation 19756  [] High Complexity PT evaluation 87207  [] PT Re-evaluation 55366  [] Gait training 63346 0 minutes  [] Manual therapy 74588 0 minutes  [] Therapeutic activities 91625 0 minutes  [] Therapeutic exercises 23095 0 minutes  [] Neuromuscular reeducation 07095 0 minutes       Sid John PT, DPT   [id number]

## 2024-10-21 NOTE — DISCHARGE INSTRUCTIONS
tenecteplase  Pronunciation:  ten EK te plase  Brand:  TNKase  What is the most important information I should know about tenecteplase?  If possible before you receive tenecteplase, tell your doctor if you have a brain tumor or aneurysm, high blood pressure, hemophilia or other bleeding disorder, a history of stroke, or if you have recently had a head injury or surgery on your brain or spinal cord.  In an emergency, you may not be able to tell caregivers about your health conditions. Make sure any doctor caring for you afterward knows you received this medicine.  What is tenecteplase?  Tenecteplase is a thrombolytic (THROM-zen-LIT-ik) drug, sometimes called a \"clot-busting\" drug. It helps your body produce a substance that dissolves unwanted blood clots.  Tenecteplase is used to prevent death in people who have had a heart attack (acute myocardial infarction).  Tenecteplase may also be used for purposes not listed in this medication guide.  What should I discuss with my health care provider before I receive tenecteplase?  You should not be treated with tenecteplase if you are allergic to it, or if you have:  active bleeding inside your body;  a recent history of medical trauma or injury;  severe or uncontrolled high blood pressure;  a genetic disorder affecting the blood vessels in your brain;  a brain tumor, blood vessel disorder, or aneurysm (dilated blood vessel);  a bleeding or blood clotting disorder (such as hemophilia);  a history of stroke; or  if you have had brain or spinal cord injury or surgery within the past 2 months.  If possible before you receive tenecteplase, tell your doctor if you have ever had:  a stroke;  bleeding in your brain, stomach, intestines, or urinary tract;  high blood pressure;  heart problems;  an infection of the lining of your heart (also called bacterial endocarditis);  liver or kidney disease;  eye problems caused by diabetes;  severe bruising or infection around a vein

## 2024-10-21 NOTE — CARE COORDINATION
reached out to patients PCP office Dr Viktor Camejo at [phone] to schedule follow up D/C appointment.  spoke to office staff who report patient can come in as a walk-in on Monday/Tuesday/Thursday or Fridays from 8-3:00 PM.     Information added to D/C summary.

## 2024-10-21 NOTE — PLAN OF CARE
Neurology following for acute stroke s/p TNK.    Pending discharge, but will follow in stroke clinic. Recommend echo with bubble completion prior to discharge    Risk factor modification  Outpatient sleep med eval  Continue Aspirin and statin

## 2024-10-21 NOTE — CARE COORDINATION
SP TNKase   Workup done   Continue aspirin 81 mg daily  High intensity statin therapy with a goal LDL<70  Smoking cessation  Outpatient sleep study for possible SCOTT    DC today if ok with neurology

## 2024-10-22 NOTE — DISCHARGE SUMMARY
SYSTEM PROVIDED HISTORY: stroke TECHNOLOGIST PROVIDED HISTORY: Reason for exam:->stroke Has a \"code stroke\" or \"stroke alert\" been called?->Yes UNENHANCED CT SCAN OF HEAD BRAIN/VENTRICLES: There is no acute intracranial hemorrhage, mass effect or midline shift.  No abnormal extra-axial fluid collection.  The gray-white differentiation is maintained without evidence of an acute infarct.  There is no evidence of hydrocephalus. ORBITS: The visualized portion of the orbits demonstrate no acute abnormality. SINUSES: The visualized paranasal sinuses and mastoid air cells demonstrate no acute abnormality. SOFT TISSUES/SKULL:  No acute abnormality of the visualized skull or soft tissues. CTA OF THE HEAD ANTERIOR CIRCULATION: No significant stenosis of the intracranial internal carotid, anterior cerebral, or middle cerebral arteries. No aneurysm. POSTERIOR CIRCULATION: No significant stenosis of the vertebral, basilar, or posterior cerebral arteries. No aneurysm. OTHER: No dural venous sinus thrombosis on this non-dedicated study. BRAIN: No mass effect or midline shift. No extra-axial fluid collection. The gray-white differentiation is maintained.     CTA HEAD: No aneurysm. No significant intracranial artery stenosis. CT BRAIN: No acute intracranial abnormality.  No intracranial hemorrhage.     CT HEAD WO CONTRAST    Result Date: 10/18/2024  EXAMINATION: CT OF THE HEAD WITHOUT CONTRAST; CTA OF THE HEAD WITH CONTRAST 10/18/2024 8:28 pm; 10/18/2024 8:29 pm TECHNIQUE: CT of the head was performed without the administration of intravenous contrast. Automated exposure control, iterative reconstruction, and/or weight based adjustment of the mA/kV was utilized to reduce the radiation dose to as low as reasonably achievable.; CTA of the head/brain was performed with the administration of intravenous contrast. Multiplanar reformatted images are provided for review.  MIP images are provided for review. Automated exposure control,

## 2024-10-25 ENCOUNTER — TELEPHONE (OUTPATIENT)
Dept: NEUROLOGY | Age: 54
End: 2024-10-25

## 2024-10-25 NOTE — TELEPHONE ENCOUNTER
Willard admitted to [facility] for stroke and was told to follow up with Maria D in month.  D/C 10/21.

## 2024-10-29 ENCOUNTER — APPOINTMENT (OUTPATIENT)
Dept: GENERAL RADIOLOGY | Age: 54
DRG: 065 | End: 2024-10-29
Payer: COMMERCIAL

## 2024-10-29 ENCOUNTER — APPOINTMENT (OUTPATIENT)
Dept: MRI IMAGING | Age: 54
DRG: 065 | End: 2024-10-29
Payer: COMMERCIAL

## 2024-10-29 ENCOUNTER — HOSPITAL ENCOUNTER (INPATIENT)
Age: 54
LOS: 1 days | Discharge: HOME | DRG: 065 | End: 2024-10-30
Attending: EMERGENCY MEDICINE | Admitting: STUDENT IN AN ORGANIZED HEALTH CARE EDUCATION/TRAINING PROGRAM
Payer: COMMERCIAL

## 2024-10-29 ENCOUNTER — APPOINTMENT (OUTPATIENT)
Dept: CT IMAGING | Age: 54
DRG: 065 | End: 2024-10-29
Payer: COMMERCIAL

## 2024-10-29 DIAGNOSIS — R29.90 STROKE-LIKE SYMPTOMS: Primary | ICD-10-CM

## 2024-10-29 DIAGNOSIS — I63.81 CEREBROVASCULAR ACCIDENT (CVA) OF LEFT BASAL GANGLIA (HCC): ICD-10-CM

## 2024-10-29 LAB
ALBUMIN SERPL-MCNC: 4.2 G/DL (ref 3.5–5.2)
ALP SERPL-CCNC: 98 U/L (ref 40–129)
ALT SERPL-CCNC: 20 U/L (ref 0–40)
ANION GAP SERPL CALCULATED.3IONS-SCNC: 7 MMOL/L (ref 7–16)
AST SERPL-CCNC: 18 U/L (ref 0–39)
BASOPHILS # BLD: 0.08 K/UL (ref 0–0.2)
BASOPHILS NFR BLD: 1 % (ref 0–2)
BILIRUB SERPL-MCNC: 0.2 MG/DL (ref 0–1.2)
BUN SERPL-MCNC: 9 MG/DL (ref 6–20)
CALCIUM SERPL-MCNC: 8.4 MG/DL (ref 8.6–10.2)
CHLORIDE SERPL-SCNC: 101 MMOL/L (ref 98–107)
CO2 SERPL-SCNC: 23 MMOL/L (ref 22–29)
CREAT SERPL-MCNC: 0.8 MG/DL (ref 0.7–1.2)
EKG ATRIAL RATE: 77 BPM
EKG P AXIS: 59 DEGREES
EKG P-R INTERVAL: 138 MS
EKG Q-T INTERVAL: 358 MS
EKG QRS DURATION: 90 MS
EKG QTC CALCULATION (BAZETT): 405 MS
EKG R AXIS: 28 DEGREES
EKG T AXIS: 35 DEGREES
EKG VENTRICULAR RATE: 77 BPM
EOSINOPHIL # BLD: 0.34 K/UL (ref 0.05–0.5)
EOSINOPHILS RELATIVE PERCENT: 2 % (ref 0–6)
ERYTHROCYTE [DISTWIDTH] IN BLOOD BY AUTOMATED COUNT: 12.4 % (ref 11.5–15)
GFR, ESTIMATED: >90 ML/MIN/1.73M2
GLUCOSE BLD-MCNC: 100 MG/DL (ref 74–99)
GLUCOSE SERPL-MCNC: 101 MG/DL (ref 74–99)
HCT VFR BLD AUTO: 43.4 % (ref 37–54)
HGB BLD-MCNC: 14.7 G/DL (ref 12.5–16.5)
IMM GRANULOCYTES # BLD AUTO: 0.06 K/UL (ref 0–0.58)
IMM GRANULOCYTES NFR BLD: 0 % (ref 0–5)
INR PPP: 0.9
LYMPHOCYTES NFR BLD: 2.44 K/UL (ref 1.5–4)
LYMPHOCYTES RELATIVE PERCENT: 15 % (ref 20–42)
MCH RBC QN AUTO: 30.6 PG (ref 26–35)
MCHC RBC AUTO-ENTMCNC: 33.9 G/DL (ref 32–34.5)
MCV RBC AUTO: 90.2 FL (ref 80–99.9)
MONOCYTES NFR BLD: 1.07 K/UL (ref 0.1–0.95)
MONOCYTES NFR BLD: 7 % (ref 2–12)
NEUTROPHILS NFR BLD: 75 % (ref 43–80)
NEUTS SEG NFR BLD: 12.05 K/UL (ref 1.8–7.3)
PARTIAL THROMBOPLASTIN TIME: 32.4 SEC (ref 24.5–35.1)
PLATELET # BLD AUTO: 333 K/UL (ref 130–450)
PMV BLD AUTO: 10.9 FL (ref 7–12)
POTASSIUM SERPL-SCNC: 4.4 MMOL/L (ref 3.5–5)
PROT SERPL-MCNC: 6.1 G/DL (ref 6.4–8.3)
PROTHROMBIN TIME: 9.6 SEC (ref 9.3–12.4)
RBC # BLD AUTO: 4.81 M/UL (ref 3.8–5.8)
SODIUM SERPL-SCNC: 131 MMOL/L (ref 132–146)
TROPONIN I SERPL HS-MCNC: 6 NG/L (ref 0–11)
WBC OTHER # BLD: 16 K/UL (ref 4.5–11.5)

## 2024-10-29 PROCEDURE — 80053 COMPREHEN METABOLIC PANEL: CPT

## 2024-10-29 PROCEDURE — 93005 ELECTROCARDIOGRAM TRACING: CPT | Performed by: EMERGENCY MEDICINE

## 2024-10-29 PROCEDURE — 6360000004 HC RX CONTRAST MEDICATION: Performed by: RADIOLOGY

## 2024-10-29 PROCEDURE — 70450 CT HEAD/BRAIN W/O DYE: CPT

## 2024-10-29 PROCEDURE — 99285 EMERGENCY DEPT VISIT HI MDM: CPT

## 2024-10-29 PROCEDURE — 70498 CT ANGIOGRAPHY NECK: CPT

## 2024-10-29 PROCEDURE — 0042T CT BRAIN PERFUSION: CPT

## 2024-10-29 PROCEDURE — 85025 COMPLETE CBC W/AUTO DIFF WBC: CPT

## 2024-10-29 PROCEDURE — 85730 THROMBOPLASTIN TIME PARTIAL: CPT

## 2024-10-29 PROCEDURE — 6370000000 HC RX 637 (ALT 250 FOR IP): Performed by: EMERGENCY MEDICINE

## 2024-10-29 PROCEDURE — 2580000003 HC RX 258: Performed by: STUDENT IN AN ORGANIZED HEALTH CARE EDUCATION/TRAINING PROGRAM

## 2024-10-29 PROCEDURE — 84484 ASSAY OF TROPONIN QUANT: CPT

## 2024-10-29 PROCEDURE — 2060000000 HC ICU INTERMEDIATE R&B

## 2024-10-29 PROCEDURE — 70551 MRI BRAIN STEM W/O DYE: CPT

## 2024-10-29 PROCEDURE — 82962 GLUCOSE BLOOD TEST: CPT

## 2024-10-29 PROCEDURE — 70496 CT ANGIOGRAPHY HEAD: CPT

## 2024-10-29 PROCEDURE — 93010 ELECTROCARDIOGRAM REPORT: CPT | Performed by: INTERNAL MEDICINE

## 2024-10-29 PROCEDURE — 6370000000 HC RX 637 (ALT 250 FOR IP): Performed by: STUDENT IN AN ORGANIZED HEALTH CARE EDUCATION/TRAINING PROGRAM

## 2024-10-29 PROCEDURE — 85610 PROTHROMBIN TIME: CPT

## 2024-10-29 PROCEDURE — 71045 X-RAY EXAM CHEST 1 VIEW: CPT

## 2024-10-29 RX ORDER — NICOTINE 21 MG/24HR
1 PATCH, TRANSDERMAL 24 HOURS TRANSDERMAL DAILY
Status: DISCONTINUED | OUTPATIENT
Start: 2024-10-29 | End: 2024-10-30 | Stop reason: HOSPADM

## 2024-10-29 RX ORDER — ATORVASTATIN CALCIUM 40 MG/1
40 TABLET, FILM COATED ORAL NIGHTLY
Status: DISCONTINUED | OUTPATIENT
Start: 2024-10-29 | End: 2024-10-30

## 2024-10-29 RX ORDER — SENNOSIDES A AND B 8.6 MG/1
1 TABLET, FILM COATED ORAL DAILY PRN
Status: DISCONTINUED | OUTPATIENT
Start: 2024-10-29 | End: 2024-10-30 | Stop reason: HOSPADM

## 2024-10-29 RX ORDER — ENOXAPARIN SODIUM 100 MG/ML
40 INJECTION SUBCUTANEOUS DAILY
Status: DISCONTINUED | OUTPATIENT
Start: 2024-10-29 | End: 2024-10-30 | Stop reason: HOSPADM

## 2024-10-29 RX ORDER — ASPIRIN 81 MG/1
81 TABLET, CHEWABLE ORAL DAILY
Status: DISCONTINUED | OUTPATIENT
Start: 2024-10-30 | End: 2024-10-30 | Stop reason: HOSPADM

## 2024-10-29 RX ORDER — ACETAMINOPHEN 650 MG/1
650 SUPPOSITORY RECTAL EVERY 6 HOURS PRN
Status: DISCONTINUED | OUTPATIENT
Start: 2024-10-29 | End: 2024-10-30 | Stop reason: HOSPADM

## 2024-10-29 RX ORDER — IOPAMIDOL 755 MG/ML
105 INJECTION, SOLUTION INTRAVASCULAR
Status: COMPLETED | OUTPATIENT
Start: 2024-10-29 | End: 2024-10-29

## 2024-10-29 RX ORDER — ONDANSETRON 4 MG/1
4 TABLET, ORALLY DISINTEGRATING ORAL EVERY 8 HOURS PRN
Status: DISCONTINUED | OUTPATIENT
Start: 2024-10-29 | End: 2024-10-30 | Stop reason: HOSPADM

## 2024-10-29 RX ORDER — SODIUM CHLORIDE 9 MG/ML
INJECTION, SOLUTION INTRAVENOUS PRN
Status: DISCONTINUED | OUTPATIENT
Start: 2024-10-29 | End: 2024-10-30 | Stop reason: HOSPADM

## 2024-10-29 RX ORDER — ONDANSETRON 2 MG/ML
4 INJECTION INTRAMUSCULAR; INTRAVENOUS EVERY 6 HOURS PRN
Status: DISCONTINUED | OUTPATIENT
Start: 2024-10-29 | End: 2024-10-30 | Stop reason: HOSPADM

## 2024-10-29 RX ORDER — SODIUM CHLORIDE 0.9 % (FLUSH) 0.9 %
10 SYRINGE (ML) INJECTION
Status: ACTIVE | OUTPATIENT
Start: 2024-10-29 | End: 2024-10-30

## 2024-10-29 RX ORDER — SODIUM CHLORIDE 0.9 % (FLUSH) 0.9 %
10 SYRINGE (ML) INJECTION PRN
Status: DISCONTINUED | OUTPATIENT
Start: 2024-10-29 | End: 2024-10-30 | Stop reason: HOSPADM

## 2024-10-29 RX ORDER — SODIUM CHLORIDE 0.9 % (FLUSH) 0.9 %
10 SYRINGE (ML) INJECTION EVERY 12 HOURS SCHEDULED
Status: DISCONTINUED | OUTPATIENT
Start: 2024-10-29 | End: 2024-10-30 | Stop reason: HOSPADM

## 2024-10-29 RX ORDER — ACETAMINOPHEN 325 MG/1
650 TABLET ORAL EVERY 6 HOURS PRN
Status: DISCONTINUED | OUTPATIENT
Start: 2024-10-29 | End: 2024-10-30 | Stop reason: HOSPADM

## 2024-10-29 RX ORDER — ASPIRIN 81 MG/1
324 TABLET, CHEWABLE ORAL ONCE
Status: COMPLETED | OUTPATIENT
Start: 2024-10-29 | End: 2024-10-29

## 2024-10-29 RX ADMIN — IOPAMIDOL 105 ML: 755 INJECTION, SOLUTION INTRAVENOUS at 06:57

## 2024-10-29 RX ADMIN — SODIUM CHLORIDE, PRESERVATIVE FREE 10 ML: 5 INJECTION INTRAVENOUS at 22:40

## 2024-10-29 RX ADMIN — ATORVASTATIN CALCIUM 40 MG: 40 TABLET, FILM COATED ORAL at 22:40

## 2024-10-29 RX ADMIN — ASPIRIN 81 MG 324 MG: 81 TABLET ORAL at 09:39

## 2024-10-29 ASSESSMENT — PAIN SCALES - GENERAL
PAINLEVEL_OUTOF10: 0
PAINLEVEL_OUTOF10: 0

## 2024-10-29 NOTE — H&P
Internal Medicine History & Physical     Name: Stanford Saleh  : 1970  Chief Complaint: Cerebrovascular Accident (Reports right sided weakness, and numbness. Started around 6pm lastnight, increasingly gotten worse. )  Primary Care Physician: Viktor Camejo III, DO  Admission date: 10/29/2024  Date of service: 10/29/2024     History of Present Illness  Stanford is a 54 y.o. year old male who presented with a chief complaint of right sided weakness. He was recently admitted for aphasia and had stroke workup completed was seen by neuro at that time. He continues to smoke but states about 4 ciggarettes per day. His wife is at bedside. CTA and CTP both unremarkable. Will order brain mri and neurology consultation. He continues to have R sided weakness in the RUE and RLE      No past medical history on file.    No past surgical history on file.    No family history on file.      Social History  Patient lives at home .   At baseline patient ambulates with out assistance   Illicit drugs: Denies   TOBACCO:   reports that he has been smoking cigarettes. He has been exposed to tobacco smoke. He does not have any smokeless tobacco history on file.  ETOH:   has no history on file for alcohol use.    Home Medications  Prior to Admission medications    Medication Sig Start Date End Date Taking? Authorizing Provider   aspirin 81 MG chewable tablet Take 1 tablet by mouth daily 10/22/24  Yes Leighton Garay MD   atorvastatin (LIPITOR) 40 MG tablet Take 1 tablet by mouth nightly 10/21/24  Yes Leighton Garay MD   nicotine (NICODERM CQ) 14 MG/24HR Place 1 patch onto the skin daily 10/21/24  Yes Leighton Garay MD   loratadine (ALAVERT) 10 MG dissolvable tablet Take 1 tablet by mouth daily   Yes ProviderDenise MD       Allergies  No Known Allergies    Review of Systems  Please see HPI above. All bolded are positive. All un-bolded are negative.  Constitutional Symptoms: fever, chills, fatigue, generalized weakness,

## 2024-10-29 NOTE — ED PROVIDER NOTES
results: Relayed results to Dr. Sonia Morocho at 0722 hours    on   10/29/2024         Final   No acute intracranial abnormality.            CTA HEAD W CONTRAST   Preliminary Result   1. No perfusion mismatch.  No core infarction in brain.  No ischemic penumbra   in brain.   2. Unremarkable CTA of the head.   3. Unremarkable CTA of neck.         CTA NECK W CONTRAST   Preliminary Result   1. No perfusion mismatch.  No core infarction in brain.  No ischemic penumbra   in brain.   2. Unremarkable CTA of the head.   3. Unremarkable CTA of neck.         CT BRAIN PERFUSION   Preliminary Result   1. No perfusion mismatch.  No core infarction in brain.  No ischemic penumbra   in brain.   2. Unremarkable CTA of the head.   3. Unremarkable CTA of neck.             EKG Interpretation  Interpreted by emergency department physician.     Normal sinus rhythm rate of 77, no ST segment elevation or pression, IL interval 138 MS, QRS 90 MS, QTc 405 ms        ------------------------- NURSING NOTES AND VITALS REVIEWED ---------------------------   The nursing notes within the ED encounter and vital signs as below have been reviewed.   BP (!) 145/82   Pulse 79   Temp 98.2 °F (36.8 °C) (Oral)   Resp 20   Ht 1.676 m (5' 6\")   Wt 68 kg (150 lb)   SpO2 99%   BMI 24.21 kg/m²   Oxygen Saturation Interpretation: Normal    The patient’s available past medical records and past encounters were reviewed.        ------------------------------ ED COURSE/MEDICAL DECISION MAKING----------------------  Medications   sodium chloride flush 0.9 % injection 10 mL (has no administration in time range)   aspirin chewable tablet 324 mg (has no administration in time range)   iopamidol (ISOVUE-370) 76 % injection 105 mL (105 mLs IntraVENous Given 10/29/24 0657)       Based upon patient's stroke like symptoms a stroke neurology consult is indicated.   Consult to Neurology completed. [facility] Neurology      Acute CVA Core Measures:   Last Known to be Well

## 2024-10-29 NOTE — ED NOTES
Stroke/ Johana Alert Time:Johaan Alert@0636      Time Neurologist called:0638  :    Time CT Notified: 0636      BRAIN alert time: (If applicable)

## 2024-10-30 VITALS
OXYGEN SATURATION: 96 % | TEMPERATURE: 97 F | HEART RATE: 68 BPM | WEIGHT: 150 LBS | HEIGHT: 66 IN | SYSTOLIC BLOOD PRESSURE: 115 MMHG | DIASTOLIC BLOOD PRESSURE: 69 MMHG | RESPIRATION RATE: 18 BRPM | BODY MASS INDEX: 24.11 KG/M2

## 2024-10-30 PROBLEM — I63.81 CEREBROVASCULAR ACCIDENT (CVA) OF LEFT BASAL GANGLIA (HCC): Status: ACTIVE | Noted: 2024-10-20

## 2024-10-30 LAB
ALBUMIN SERPL-MCNC: 4.2 G/DL (ref 3.5–5.2)
ALP SERPL-CCNC: 81 U/L (ref 40–129)
ALT SERPL-CCNC: 19 U/L (ref 0–40)
ANION GAP SERPL CALCULATED.3IONS-SCNC: 8 MMOL/L (ref 7–16)
AST SERPL-CCNC: 15 U/L (ref 0–39)
BASOPHILS # BLD: 0.09 K/UL (ref 0–0.2)
BASOPHILS NFR BLD: 1 % (ref 0–2)
BILIRUB SERPL-MCNC: 0.3 MG/DL (ref 0–1.2)
BUN SERPL-MCNC: 8 MG/DL (ref 6–20)
CALCIUM SERPL-MCNC: 8.6 MG/DL (ref 8.6–10.2)
CHLORIDE SERPL-SCNC: 100 MMOL/L (ref 98–107)
CO2 SERPL-SCNC: 25 MMOL/L (ref 22–29)
CREAT SERPL-MCNC: 0.7 MG/DL (ref 0.7–1.2)
EOSINOPHIL # BLD: 0.38 K/UL (ref 0.05–0.5)
EOSINOPHILS RELATIVE PERCENT: 3 % (ref 0–6)
ERYTHROCYTE [DISTWIDTH] IN BLOOD BY AUTOMATED COUNT: 12.5 % (ref 11.5–15)
GFR, ESTIMATED: >90 ML/MIN/1.73M2
GLUCOSE SERPL-MCNC: 90 MG/DL (ref 74–99)
HCT VFR BLD AUTO: 42.8 % (ref 37–54)
HGB BLD-MCNC: 14.2 G/DL (ref 12.5–16.5)
IMM GRANULOCYTES # BLD AUTO: 0.05 K/UL (ref 0–0.58)
IMM GRANULOCYTES NFR BLD: 0 % (ref 0–5)
LYMPHOCYTES NFR BLD: 1.81 K/UL (ref 1.5–4)
LYMPHOCYTES RELATIVE PERCENT: 16 % (ref 20–42)
MCH RBC QN AUTO: 30.1 PG (ref 26–35)
MCHC RBC AUTO-ENTMCNC: 33.2 G/DL (ref 32–34.5)
MCV RBC AUTO: 90.9 FL (ref 80–99.9)
MONOCYTES NFR BLD: 1.04 K/UL (ref 0.1–0.95)
MONOCYTES NFR BLD: 9 % (ref 2–12)
NEUTROPHILS NFR BLD: 70 % (ref 43–80)
NEUTS SEG NFR BLD: 7.9 K/UL (ref 1.8–7.3)
PLATELET # BLD AUTO: 256 K/UL (ref 130–450)
PMV BLD AUTO: 10.7 FL (ref 7–12)
POTASSIUM SERPL-SCNC: 4.3 MMOL/L (ref 3.5–5)
PROT SERPL-MCNC: 6.2 G/DL (ref 6.4–8.3)
RBC # BLD AUTO: 4.71 M/UL (ref 3.8–5.8)
SODIUM SERPL-SCNC: 133 MMOL/L (ref 132–146)
WBC OTHER # BLD: 11.3 K/UL (ref 4.5–11.5)

## 2024-10-30 PROCEDURE — 6360000002 HC RX W HCPCS: Performed by: STUDENT IN AN ORGANIZED HEALTH CARE EDUCATION/TRAINING PROGRAM

## 2024-10-30 PROCEDURE — 97161 PT EVAL LOW COMPLEX 20 MIN: CPT

## 2024-10-30 PROCEDURE — 80053 COMPREHEN METABOLIC PANEL: CPT

## 2024-10-30 PROCEDURE — 6370000000 HC RX 637 (ALT 250 FOR IP): Performed by: STUDENT IN AN ORGANIZED HEALTH CARE EDUCATION/TRAINING PROGRAM

## 2024-10-30 PROCEDURE — 6370000000 HC RX 637 (ALT 250 FOR IP): Performed by: NURSE PRACTITIONER

## 2024-10-30 PROCEDURE — 99232 SBSQ HOSP IP/OBS MODERATE 35: CPT | Performed by: NURSE PRACTITIONER

## 2024-10-30 PROCEDURE — 97530 THERAPEUTIC ACTIVITIES: CPT

## 2024-10-30 PROCEDURE — 2580000003 HC RX 258: Performed by: STUDENT IN AN ORGANIZED HEALTH CARE EDUCATION/TRAINING PROGRAM

## 2024-10-30 PROCEDURE — 36415 COLL VENOUS BLD VENIPUNCTURE: CPT

## 2024-10-30 PROCEDURE — 97165 OT EVAL LOW COMPLEX 30 MIN: CPT

## 2024-10-30 PROCEDURE — 85025 COMPLETE CBC W/AUTO DIFF WBC: CPT

## 2024-10-30 PROCEDURE — 97535 SELF CARE MNGMENT TRAINING: CPT

## 2024-10-30 RX ORDER — AMLODIPINE BESYLATE 5 MG/1
5 TABLET ORAL DAILY
Status: DISCONTINUED | OUTPATIENT
Start: 2024-10-30 | End: 2024-10-30 | Stop reason: HOSPADM

## 2024-10-30 RX ORDER — ATORVASTATIN CALCIUM 80 MG/1
80 TABLET, FILM COATED ORAL NIGHTLY
Qty: 30 TABLET | Refills: 3 | Status: SHIPPED | OUTPATIENT
Start: 2024-10-30

## 2024-10-30 RX ORDER — AMLODIPINE BESYLATE 5 MG/1
5 TABLET ORAL DAILY
Qty: 30 TABLET | Refills: 3 | Status: SHIPPED | OUTPATIENT
Start: 2024-10-30

## 2024-10-30 RX ORDER — CLOPIDOGREL BISULFATE 75 MG/1
75 TABLET ORAL DAILY
Status: DISCONTINUED | OUTPATIENT
Start: 2024-10-30 | End: 2024-10-30 | Stop reason: HOSPADM

## 2024-10-30 RX ORDER — CLOPIDOGREL BISULFATE 75 MG/1
75 TABLET ORAL DAILY
Qty: 21 TABLET | Refills: 0 | Status: SHIPPED | OUTPATIENT
Start: 2024-10-30 | End: 2024-11-20

## 2024-10-30 RX ORDER — ATORVASTATIN CALCIUM 40 MG/1
80 TABLET, FILM COATED ORAL NIGHTLY
Status: DISCONTINUED | OUTPATIENT
Start: 2024-10-30 | End: 2024-10-30 | Stop reason: HOSPADM

## 2024-10-30 RX ADMIN — ENOXAPARIN SODIUM 40 MG: 100 INJECTION SUBCUTANEOUS at 09:16

## 2024-10-30 RX ADMIN — AMLODIPINE BESYLATE 5 MG: 5 TABLET ORAL at 13:04

## 2024-10-30 RX ADMIN — SODIUM CHLORIDE, PRESERVATIVE FREE 10 ML: 5 INJECTION INTRAVENOUS at 09:16

## 2024-10-30 RX ADMIN — ASPIRIN 81 MG CHEWABLE TABLET 81 MG: 81 TABLET CHEWABLE at 09:16

## 2024-10-30 RX ADMIN — CLOPIDOGREL BISULFATE 75 MG: 75 TABLET ORAL at 13:04

## 2024-10-30 NOTE — PROGRESS NOTES
4 Eyes Skin Assessment     NAME:  Stanford Saleh  YOB: 1970  MEDICAL RECORD NUMBER:  [id number]    The patient is being assessed for  Admission    I agree that at least one RN has performed a thorough Head to Toe Skin Assessment on the patient. ALL assessment sites listed below have been assessed.      Areas assessed by both nurses:    Head, Face, Ears, Shoulders, Back, Chest, Arms, Elbows, Hands, Sacrum. Buttock, Coccyx, Ischium, Legs. Feet and Heels, and Under Medical Devices         Does the Patient have a Wound? No noted wound(s)       Hermes Prevention initiated by RN: No  Wound Care Orders initiated by RN: No    Pressure Injury (Stage 3,4, Unstageable, DTI, NWPT, and Complex wounds) if present, place Wound referral order by RN under : No    New Ostomies, if present place, Ostomy referral order under : No     Nurse 1 eSignature: Electronically signed by Kari Carranza RN on 10/29/24 at 11:02 PM EDT    **SHARE this note so that the co-signing nurse can place an eSignature**    Nurse 2 eSignature: Electronically signed by Teetee Armenta RN on 10/30/24 at 12:09 AM EDT

## 2024-10-30 NOTE — PROGRESS NOTES
Internal Medicine Progress Note    Patient's name: Stanford Saleh  : 1970  Chief complaints (on day of admission): Cerebrovascular Accident (Reports right sided weakness, and numbness. Started around 6pm lastnight, increasingly gotten worse. )  Admission date: 10/29/2024  Date of service: 10/30/2024   Room: [address] IMCU/NEURO  Primary care physician: Viktor Camejo III, DO  Reason for visit: Follow-up for CVA     Subjective  Stanford was seen and examined at bedside     Went over MRI results   CTA head and neck results   Went over ECHO from last admission   Discussed lacunar area of stroke   Discussed risk factor modification at great length   His LDL needs work   His BP has been in good range   He needs to STOP smoking even though he endorses cutting back greatly     Review of Systems  There are no new complaints of chest pain, shortness of breath, abdominal pain, nausea, vomiting, diarrhea, constipation unless otherwise mentioned above.     Hospital Medications  Current Facility-Administered Medications   Medication Dose Route Frequency Provider Last Rate Last Admin    clopidogrel (PLAVIX) tablet 75 mg  75 mg Oral Daily Heidi Perez, APRN - CNP        aspirin chewable tablet 81 mg  81 mg Oral Daily Leighton Garay MD   81 mg at 10/30/24 0916    atorvastatin (LIPITOR) tablet 40 mg  40 mg Oral Nightly Leighton Garay MD   40 mg at 10/29/24 2240    nicotine (NICODERM CQ) 14 MG/24HR 1 patch  1 patch TransDERmal Daily Leighton Garay MD        sodium chloride flush 0.9 % injection 10 mL  10 mL IntraVENous 2 times per day Leighton Garay MD   10 mL at 10/30/24 0916    sodium chloride flush 0.9 % injection 10 mL  10 mL IntraVENous PRN Leighton Garay MD        0.9 % sodium chloride infusion   IntraVENous PRN Leighton Garay MD        enoxaparin (LOVENOX) injection 40 mg  40 mg SubCUTAneous Daily Leighton Garay MD   40 mg at 10/30/24 0916    ondansetron (ZOFRAN-ODT) disintegrating tablet 4 mg  4 mg Oral Q8H PRN

## 2024-10-30 NOTE — PROGRESS NOTES
[facility] Neurology follow up      Stanford Saleh is a 54 y.o. right handed male     Neurology following for stroke    PMH of cavernoma, HTN, HLD, possible SCOTT, current everyday smoker    Patient presented to ED on 10/29/2024 with right-sided weakness and numbness.  LKW was 11 hours prior so patient was not a TNK candidate.  He was recently admitted 10 days previous with strokelike symptoms of right-sided weakness and numbness.  At that time, he received TNK and his symptoms of right-sided weakness resolved.  CT head was negative for acute findings.  CTAs head/neck did not show any significant stenosis or LVO.  MRI brain did not showed any acute stroke but did show a cavernoma in the left basal ganglia.  He was discharged home on aspirin and statin therapy due to no acute stroke.  MRI brain was complete this admission and showed an acute left basal ganglia stroke adjacent to his cavernoma.    Case was reviewed by Dr. Roland and acute stroke adjacent to cavernoma was incidental and likely unrelated to cavernoma.  Believe that TNK did abort stroke 10 days earlier.    Patient was seen in the eighth floor waiting room with his wife.  Discussed MRI brain results and findings of acute left basal ganglia stroke adjacent to his cavernoma.  Addressed questions and concerns.  Advised them that neuro IR did review patient's imaging and believe that this was an incidental circumstance of acute stroke and it being adjacent to the cavernoma.  Advised patient will be discharged on DAPT and statin therapy.  Patient was advised to cease smoking and continue on controlling his small vessel disease risk factors.    Objective:     /69   Pulse 68   Temp 97 °F (36.1 °C) (Temporal)   Resp 18   Ht 1.676 m (5' 6\")   Wt 68 kg (150 lb)   SpO2 96%   BMI 24.21 kg/m²     General appearance: alert, appears stated age, cooperative and no distress  Head: normocephalic, without obvious abnormality, atraumatic  Eyes:

## 2024-10-30 NOTE — PROGRESS NOTES
OCCUPATIONAL THERAPY INITIAL EVALUATION    [facility] [address] [place], [place]       Date:10/30/2024                                                  Patient Name: Stanford Saleh  MRN: [id number]  : 1970  Room: [address] [place] [address]    Evaluating OT: Rafi Fairchild OTR/L [id number]    Referring Provider: Leighton Garay MD      Specific Provider Orders/Date: OT evaluation and treatment 10/29/24 0970    Diagnosis:  Stroke-like symptoms [R29.90]      Pertinent Medical History:  has no past medical history on file.   No past surgical history on file.    Pt admitted to the hospital 10/29 with R sided weakness and ataxia     Orders received, chart reviewed, eval complete.     Precautions:  Fall Risk, R weakness     Assessment of current deficits    [x] Functional mobility   [x]ADLs  [x] Strength               []Cognition   [x] Functional transfers   [x] IADLs         [x] Safety Awareness   [x]Endurance   [x] Fine Motor Coordination [x] Balance [] Vision/perception   []Sensation    [x] Gross Motor Coordination [] ROM  [] Delirium                  [] Motor Control     OT PLAN OF CARE   OT POC based on physician orders, patient diagnosis and results of clinical assessment    Frequency/Duration 1-3 days/wk for 2 weeks PRN   Specific OT Treatment to include:   * Instruction/training on adapted ADL techniques and AE recommendations to increase functional independence within precautions       * Training on energy conservation strategies, correct breathing pattern and techniques to improve independence/tolerance for self-care routine  * Functional transfer/mobility training/DME recommendations for increased independence, safety, and fall prevention  * Patient/Family education to increase follow through with safety techniques and functional independence  * Recommendation of environmental modifications for increased safety with functional transfers/mobility and

## 2024-10-30 NOTE — PROGRESS NOTES
CLINICAL PHARMACY NOTE: MEDS TO BEDS    Total # of Prescriptions Filled: 3   The following medications were delivered to the patient:  Amlodipine 5 mg  Atorvastatin 80 mg  Clopidogrel 75 mg    Additional Documentation:   Pt wife picked up in pharmacy

## 2024-10-30 NOTE — ACP (ADVANCE CARE PLANNING)
Advance Care Planning   Healthcare Decision Maker:    Primary Decision Maker: ZAIDAELENATERRI WHITFIELD - [facility] - [phone]    Click here to complete Healthcare Decision Makers including selection of the Healthcare Decision Maker Relationship (ie \"Primary\").          offered advance directives and declined. DEYSI Collins  10/30/2024

## 2024-10-30 NOTE — CARE COORDINATION
I met with pt , wife and daughter, Iva , this a.m. in the room. Pt lives with wife and Iva who works. Wife is not working. Pt returned to work 1 day and is back here. He is a . Yesterday he was independent and drove and saw his pcp about 1 week ago. He has a appt with neurology follow up from last admission on 11/21. No dme or hhc pta. Has a 2 story home with 2nd floor bed and bath and a 1/2 bath on the first floor. There is 1 step to enter the home. His pcp is dr. Camejo. PT and OT to eval. The MRI of the brain is Acute, nonhemorrhagic, lacunar infarct. Neurology to see. .DEYSI Collins  10/30/2024    Discharge order is in [facility]. Neurology has signed off. PT saw Pt who was independent and OT saw him and he was SBA. Met with pt and he wants outpatient OT. Will obtain order and provide to pt before he leaves today. DEYSI Collins  10/30/2024      Case Management Assessment  Initial Evaluation    Date/Time of Evaluation: 10/30/2024 10:19 AM  Assessment Completed by: DEYSI Collins    If patient is discharged prior to next notation, then this note serves as note for discharge by case management.    Patient Name: Stanford Saleh                   YOB: 1970  Diagnosis: Stroke-like symptoms [R29.90]                   Date / Time: 10/29/2024  6:32 AM    Patient Admission Status: Inpatient   Readmission Risk (Low < 19, Mod (19-27), High > 27): Readmission Risk Score: 11.6    Current PCP: Viktor Camejo III, DO  PCP verified by ? Yes    Chart Reviewed: Yes      History Provided by: Patient  Patient Orientation: Alert and Oriented    Patient Cognition: Alert    Hospitalization in the last 30 days (Readmission):  Yes    If yes, Readmission Assessment in CM Navigator will be completed.    Advance Directives:      Code Status: Full Code   Patient's Primary Decision Maker is: Legal Next of Kin (offered and declined.)    Primary Decision Maker: МАРИЯNAHIDTERRI - Spouse - [phone]    Discharge  QUINN Rg at bedside evaluating pt.

## 2024-10-30 NOTE — PROGRESS NOTES
Physical Therapy Initial Assessment     Name: Stanford Saleh  : 1970  MRN: [id number]      Date of Service: 10/30/2024    Evaluating PT:  Jose Christine, PT [id number]    Room #:  8507/8507-A  Diagnosis:  Stroke-like symptoms [R29.90]  PMHx/PSHx:   has no past medical history on file.   has no past surgical history on file.  Procedure/Surgery:  None  Precautions:  Falls, hx of CVA  Equipment Owned:  None  Equipment Needs:  None    Pt is interested in outpatient rehab in [place].    SUBJECTIVE:    Pt lives with wife and daughter in a split level home with 1 stair(s) to enter and 0 rail(s), 6-7 steps with 1 rail to second level.  Bed is on second floor and bath is on second floor.  Pt ambulated independently prior to admission.    OBJECTIVE:   Initial Evaluation  Date: 10/30/2024 Treatment Short Term/ Long Term   Goals   AM-PAC 6 Clicks      Was pt agreeable to Eval/treatment? Yes     Does pt have pain? None reported     Bed Mobility  Rolling: NT  Supine to sit: NT  Sit to supine: NT  Scooting: NT  Rolling: Independent  Supine to sit: Independent  Sit to supine: Independent  Scooting: Independent   Transfers Sit to stand: Independent  Stand to sit: Independent  Stand pivot: Independent with no AD     Ambulation    120 feet with no AD Supervision  300 feet Independent   Stair negotiation: ascended and descended  8 step(s) with 1 rail(s) Supervision  7 step(s) with 1 rail(s) Mod I   ROM BUE:  See OT note  BLE:  WFL     Strength BUE:  See OT note  BLE:  WFL     Balance Sitting EOB:  NT  Dynamic Standing:  Supervision with no AD  Sitting EOB:  Independent  Dynamic Standing:  Independent     Pt is A & O x 4  Sensation:  Pt denies numbness/tingling to extremities  Edema:  Unremarkable    Vitals:   HR 81 bpm at rest  HR 96 bpm post stairs    Patient education  Pt educated on use of affected extremities, benefits of OP rehab, stair negotiation pattern, role of PT, safety during functional mobility, use of call

## 2024-11-02 NOTE — DISCHARGE SUMMARY
of the brachiocephalic or subclavian arteries. CAROTID ARTERIES: No dissection, arterial injury, or hemodynamically significant stenosis by NASCET criteria. VERTEBRAL ARTERIES: No dissection, arterial injury, or significant stenosis. SOFT TISSUES: The lung apices are clear.  No cervical or superior mediastinal lymphadenopathy.  The larynx and pharynx are unremarkable.  No acute abnormality of the salivary and thyroid glands. BONES: No acute osseous abnormality.     1. No perfusion mismatch.  No core infarction in brain.  No ischemic penumbra in brain. 2. Unremarkable CTA of the head. 3. Unremarkable CTA of neck.     MRI BRAIN WO CONTRAST    Result Date: 10/29/2024  EXAMINATION: MRI OF THE BRAIN WITHOUT CONTRAST  10/29/2024 7:35 pm TECHNIQUE: Multiplanar multisequence MRI of the brain was performed without the administration of intravenous contrast. COMPARISON: CT of the head and CT angiogram of the head from today.  MRI of the brain from 10/19/2024. HISTORY: ORDERING SYSTEM PROVIDED HISTORY: acute stroke like symptoms TECHNOLOGIST PROVIDED HISTORY: Reason for exam:->acute stroke like symptoms Decision Support Exception - unselect if not a suspected or confirmed emergency medical condition->Emergency Medical Condition (MA) What reading provider will be dictating this exam?->CRC FINDINGS: INTRACRANIAL STRUCTURES/VENTRICLES: There is a focal area of restricted diffusion in the posterior limb of the internal capsule on the left with matching T2 signal abnormality, and with no hemorrhage or mass effect. Today's lesion is immediately medial to a similar old hemorrhagic lacunar infarct seen on the previous MRI from 10/19/2024. The findings are that of an acute, nonhemorrhagic, lacunar infarct of the posterior limb of the left internal capsule, medial to an old previous lacunar infarction. No mass effect or midline shift. No evidence of an acute intracranial hemorrhage. The ventricles and sulci are normal in size and

## 2024-11-06 ENCOUNTER — HOSPITAL ENCOUNTER (OUTPATIENT)
Dept: SLEEP CENTER | Age: 54
Discharge: HOME | End: 2024-11-06
Payer: COMMERCIAL

## 2024-11-06 DIAGNOSIS — I63.81 CEREBROVASCULAR ACCIDENT (CVA) OF LEFT BASAL GANGLIA (HCC): Primary | ICD-10-CM

## 2024-11-06 DIAGNOSIS — G47.30 SLEEP APNEA, UNSPECIFIED TYPE: ICD-10-CM

## 2024-11-06 PROCEDURE — 95811 POLYSOM 6/>YRS CPAP 4/> PARM: CPT

## 2024-11-07 VITALS
OXYGEN SATURATION: 99 % | HEIGHT: 66 IN | SYSTOLIC BLOOD PRESSURE: 133 MMHG | HEART RATE: 64 BPM | DIASTOLIC BLOOD PRESSURE: 85 MMHG | BODY MASS INDEX: 24.11 KG/M2 | WEIGHT: 150 LBS

## 2024-11-07 ASSESSMENT — SLEEP AND FATIGUE QUESTIONNAIRES
HOW LIKELY ARE YOU TO NOD OFF OR FALL ASLEEP WHEN YOU ARE A PASSENGER IN A CAR FOR AN HOUR WITHOUT A BREAK: MODERATE CHANCE OF DOZING
HOW LIKELY ARE YOU TO NOD OFF OR FALL ASLEEP WHILE SITTING INACTIVE IN A PUBLIC PLACE: WOULD NEVER DOZE
HOW LIKELY ARE YOU TO NOD OFF OR FALL ASLEEP WHILE LYING DOWN TO REST IN THE AFTERNOON WHEN CIRCUMSTANCES PERMIT: MODERATE CHANCE OF DOZING
HOW LIKELY ARE YOU TO NOD OFF OR FALL ASLEEP IN A CAR, WHILE STOPPED FOR A FEW MINUTES IN TRAFFIC: WOULD NEVER DOZE
HOW LIKELY ARE YOU TO NOD OFF OR FALL ASLEEP WHILE SITTING AND READING: MODERATE CHANCE OF DOZING
HOW LIKELY ARE YOU TO NOD OFF OR FALL ASLEEP WHILE SITTING QUIETLY AFTER LUNCH WITHOUT ALCOHOL: WOULD NEVER DOZE
HOW LIKELY ARE YOU TO NOD OFF OR FALL ASLEEP WHILE SITTING AND TALKING TO SOMEONE: WOULD NEVER DOZE
ESS TOTAL SCORE: 7
HOW LIKELY ARE YOU TO NOD OFF OR FALL ASLEEP WHILE WATCHING TV: SLIGHT CHANCE OF DOZING

## 2024-11-11 NOTE — PROGRESS NOTES
noted in the RUE    Dynamometer (setting 2) IE    Left 110*    Right 80*    Pinch (lateral)     Left 24#    Right 20#    Pinch (tripod)     Left 21#    Right      14#       9 Hole Peg test IE    Left 17 sec    Right 24 sec       QuickDash IE      16%      Intervention:     Pt educated on different fine motor tasks to trial at home with RUE to improve ability to complete tasks at home. Pt educated on health improvements that can be made to assist with decreasing likely zamora of another CVA occurring.     Eval Complexity: low  Profile and History- Chart reviewed  Assessment of Occupational Performance and Identification of Deficits- 3   Clinical Decision Making- no additional modifications required    Rehab Potential:                                 [x] Good  [] Fair  [] Poor        Suggested Professional Referral:       [x] No  [] Yes:  Barriers to Goal Achievement::          [x] No  [] Yes:  Domestic Concerns:                           [x] No  [] Yes:       Patient. Education:  [x] Plans/Goals, Risks/Benefits discussed  [x] Home exercise program  Method of Education: [x] Verbal  [x] Demo  [x] Written  Comprehension of Education:  [x] Verbalizes understanding.  [x] Demonstrates understanding.  [x] Needs Review.  [x] Demonstrates/verbalizes understanding of HEP/Ed previously given.     Patient understands diagnosis/prognosis and consents to treatment, plan and goals: [x] Yes    [] No     Goal Formulation: Patient  Time In: 8:00 am            Time Out: 9:00 am                      Timed Code Treatment Minutes: 35 minutes  CODE  Minutes  Units   23838 OT Eval Low 10 1   62779 OT Eval Medium     40739 OT Eval High     63278 Fluidotherapy     88331 Manual     82616 Therapeutic Ex     92642 Therapeutic Activity 35 2   97778 ADL/COMP Tech Train     59279 Neuromuscular Re-Ed     47382 OrthoManagementTraining     23100 Paraffin     21171 Electrical Stim - Attended     64064 Iontophoresis     25356 Ultrasound      Other

## 2024-11-12 ENCOUNTER — EVALUATION (OUTPATIENT)
Dept: OCCUPATIONAL THERAPY | Age: 54
End: 2024-11-12
Payer: COMMERCIAL

## 2024-11-12 DIAGNOSIS — I63.81 CEREBROVASCULAR ACCIDENT (CVA) OF LEFT BASAL GANGLIA (HCC): Primary | ICD-10-CM

## 2024-11-12 PROCEDURE — 97165 OT EVAL LOW COMPLEX 30 MIN: CPT | Performed by: OCCUPATIONAL THERAPIST

## 2024-11-12 PROCEDURE — 97530 THERAPEUTIC ACTIVITIES: CPT | Performed by: OCCUPATIONAL THERAPIST

## 2024-11-15 ENCOUNTER — EVALUATION (OUTPATIENT)
Dept: PHYSICAL THERAPY | Age: 54
End: 2024-11-15

## 2024-11-15 DIAGNOSIS — I63.81 CEREBROVASCULAR ACCIDENT (CVA) OF LEFT BASAL GANGLIA (HCC): Primary | ICD-10-CM

## 2024-11-15 NOTE — PROGRESS NOTES
[facility]          Phone: [phone] Fax: [phone]    Physical Therapy Daily Treatment Note  Date:  11/15/2024    Patient Name:  Stanford Saleh    :  1970  MRN: [id number]    Evaluating Therapist:  Maggie Downey, PT [id number]    Restrictions/Precautions:    Diagnosis:     Diagnosis Orders   1. Cerebrovascular accident (CVA) of left basal ganglia (HCC)          Treatment Diagnosis:    Insurance/Certification information:  [facility] [employer]  Referring Physician:  Leighton Garay MD  Plan of care signed (Y/N):    Visit# / total visits:  1/3-  Pain level: 3/10   Time In:  0750  Time Out:  0845    Subjective:  See evaluation    Exercises:  Exercise/Equipment Resistance/Repetitions Other comments     stepper 10 minutes Level 4     SLR 2# x 15 reps      Sidelying hip abd 2# x 15 reps      bridges 3 sec x 10 reps      Tandem stance 30 sec x 2 reps each side      Tandem gait // bars, 2 laps fwd, 2 laps bwd                                                                                                    Other Therapeutic Activities:  PT evaluation completed    Home Exercise Program:  11/15/24 - SLR, sidelying hip abd    Manual Treatments:  N/A    Modalities:  N/A     Time-in Time-out Total Time   28559  Evaluation Low Complexity 0750 0815 25   25278  Evaluation Med Complexity      63248  Evaluation High Complexity      57149  Ther Ex 0815 0845 30   15266  Neuro Re-ed        51292  Ther Activities        33387  Manual Therapy       52055  E-stim       63460  Ultrasound            Session 0750 0845 55       Treatment/Activity Tolerance:  [x] Patient tolerated treatment well [] Patient limited by fatigue  [] Patient limited by pain  [] Patient limited by other medical complications  [] Other:     Prognosis: [x] Good [] Fair  [] Poor    Patient Requires Follow-up: [x] Yes  [] No    Plan:   [] Continue per plan of care [] Alter current plan (see comments)  [x] Plan of care 
TECHNIQUE: Multiplanar multisequence MRI of the brain was performed without the administration of intravenous contrast. COMPARISON: CT head performed 10/18/2024. HISTORY: ORDERING SYSTEM PROVIDED HISTORY: CVA  post TNK TECHNOLOGIST PROVIDED HISTORY: Reason for exam:->CVA  post TNK What is the sedation requirement?->None Decision Support Exception - unselect if not a suspected or confirmed emergency medical condition->Emergency Medical Condition (MA) FINDINGS: INTRACRANIAL STRUCTURES/VENTRICLES: The sellar and suprasellar structures, optic chiasm, corpus callosum, pineal gland, tectum, and midline brainstem structures are unremarkable.  The craniocervical junction is unremarkable. There is no acute hemorrhage, mass effect, or midline shift.  There is satisfactory overall gray-white matter differentiation.  The ventricular structures are symmetric and unremarkable.  The infratentorial structures including the cerebellopontine angles and internal auditory canals are unremarkable.  There is no abnormal restricted diffusion.  There is a focus of blooming artifact in the left basal ganglia. ORBITS: The visualized portion of the orbits demonstrate no acute abnormality. SINUSES: The visualized paranasal sinuses and mastoid air cells demonstrate no acute abnormality. BONES/SOFT TISSUES: The bone marrow signal intensity appears normal. The soft tissues demonstrate no acute abnormality.     No definite acute intracranial abnormality. Focus of blooming artifact in left basal ganglia of uncertain etiology.  This may represent a cavernoma.  Follow-up pre and post-contrast MRI of the brain is recommended in 3-6 months.     CTA HEAD W CONTRAST    Result Date: 10/18/2024  EXAMINATION: CT OF THE HEAD WITHOUT CONTRAST; CTA OF THE HEAD WITH CONTRAST 10/18/2024 8:28 pm; 10/18/2024 8:29 pm TECHNIQUE: CT of the head was performed without the administration of intravenous contrast. Automated exposure control, iterative reconstruction,

## 2024-11-20 ENCOUNTER — TREATMENT (OUTPATIENT)
Dept: OCCUPATIONAL THERAPY | Age: 54
End: 2024-11-20

## 2024-11-20 ENCOUNTER — TREATMENT (OUTPATIENT)
Dept: PHYSICAL THERAPY | Age: 54
End: 2024-11-20
Payer: COMMERCIAL

## 2024-11-20 DIAGNOSIS — I63.81 CEREBROVASCULAR ACCIDENT (CVA) OF LEFT BASAL GANGLIA (HCC): Primary | ICD-10-CM

## 2024-11-20 PROCEDURE — 97110 THERAPEUTIC EXERCISES: CPT

## 2024-11-20 NOTE — PROGRESS NOTES
[facility]          Phone: [phone] Fax: [phone]    Physical Therapy Daily Treatment Note  Date:  2024    Patient Name:  Stanford Saleh    :  1970  MRN: [id number]    Evaluating Therapist:  Maggie Downey, [place] [id number]    Restrictions/Precautions:    Diagnosis:     Diagnosis Orders   1. Cerebrovascular accident (CVA) of left basal ganglia (HCC)          Treatment Diagnosis:    Insurance/Certification information:  [facility]  Referring Physician:  Leighton Garay MD  Plan of care signed (Y/N):    Visit# / total visits:  2/3-  Pain level: 3/10   Time In:  1055  Time Out:  1126    Subjective:  pt reported very motivated to get better    Exercises:  Exercise/Equipment Resistance/Repetitions Other comments     stepper 10 minutes Level 4     SLR 2# x 15 reps      Sidelying hip abd 2# x 15 reps      bridges 3 sec x 10 reps      Tandem stance 30 sec x 2 reps each side Blue balance pad     Tandem gait // bars, 2 laps fwd, 2 laps bwd      Agility ladder  R/back R/ fwd  L/back/L/fwd X 2 laps                                                                                              Other Therapeutic Activities:      Home Exercise Program:  11/15/24 - SLR, sidelying hip abd    Manual Treatments:  N/A    Modalities:  N/A     Time-in Time-out Total Time   75669  Evaluation Low Complexity      60046  Evaluation Med Complexity      46353  Evaluation High Complexity      93334  Ther Ex 1055 1126 31   04626  Neuro Re-ed        90595  Ther Activities        05835  Manual Therapy       38736  E-stim       73522  Ultrasound            Session 1055 1126 31       Treatment/Activity Tolerance:  [x] Patient tolerated treatment well [] Patient limited by fatigue  [] Patient limited by pain  [] Patient limited by other medical complications  [] Other:     Prognosis: [x] Good [] Fair  [] Poor    Patient Requires Follow-up: [x] Yes  [] No    Plan:   [x] Continue per plan of

## 2024-11-20 NOTE — PROGRESS NOTES
OCCUPATIONAL THERAPY DAILY NOTE  [facility] PHYSICIANS [place] SPECIALTY CARE [facility] OCCUPATIONAL THERAPY  [address]  [address]LO[address]  [facility] [address]  Dept: [phone]  Loc: [phone]   [facility] OT Fax: [phone]      Date:  2024  Initial Evaluation Date: 2024   Evaluating Therapist: Xin Culp OT    Patient Name:  Stanford Saleh    :  1970    Restrictions/Precautions:  none, low fall risk  Diagnosis:  I63.81 (ICD-10-CM) - Cerebrovascular accident (CVA) of left basal ganglia (HCC)                                                               Date of Surgery/Injury: 10/29/2024 ( CVA a week prior to 10/18/2024)     Insurance/Certification information:  [facility]  Plan of care signed (Y/N): N  Visit# / total visits: 2 / 6-8     Referring Practitioner:  Leighton Garay MD   Specific Practitioner Orders: Eval and treat    OT PLAN OF CARE   OT POC based on physician orders, patient diagnosis and results of clinical assessment     Frequency/Duration:1x / week for 6-8 visits.   Certification period From: 2024 To: 2024    GOALS (Long term same as Short term):  1) Patient will demonstrate good understanding of home program (exercises/activities/diagnosis/prognosis/goals) with good accuracy.   2) Patient will demonstrate increased /pinch strength of at least 10 / 3-5 pinch pounds of their R hand.   3) Increase in fine motor function as evidenced by decreased time to complete 9-hole peg test and/or MRMT test by at least 5-10 seconds with  RUE in order to complete tying his shoes and writing his name.   4) Patient will report ADL functions as Mod I/I using RUE.   5) Patient will demonstrate improved functional activity tolerance from fair to good + for ADL/IADL completion.  6) Patient will decrease QuickDASH score to 5% or less for increased participation in daily functional activities.   TODAY'S TREATMENT     Pain Level: no pain

## 2024-11-21 ENCOUNTER — SCHEDULED TELEPHONE ENCOUNTER (OUTPATIENT)
Dept: NEUROLOGY | Age: 54
End: 2024-11-21
Payer: COMMERCIAL

## 2024-11-21 DIAGNOSIS — I63.81 CEREBROVASCULAR ACCIDENT (CVA) DUE TO OCCLUSION OF SMALL ARTERY (HCC): Primary | ICD-10-CM

## 2024-11-21 PROCEDURE — 99442 PR PHYS/QHP TELEPHONE EVALUATION 11-20 MIN: CPT | Performed by: PHYSICIAN ASSISTANT

## 2024-11-21 NOTE — PROGRESS NOTES
[facility]  NEUROLOGY  Maria D GARCIA PA-C  Phone: [phone]  Fax: [phone]           Documentation:  I communicated with the patient and/or health care decision maker about recent hospitalization.   Details of this discussion including any medical advice provided: medications, stroke prevention    Total Time: minutes: 11-20 minutes    Stanford Saleh was evaluated through a synchronous (real-time) audio encounter. Patient identification was verified at the start of the visit. He (or guardian if applicable) is aware that this is a billable service, which includes applicable co-pays. This visit was conducted with the patient's (and/or legal guardian's) verbal consent. He has not had a related appointment within my department in the past 7 days or scheduled within the next 24 hours.   The patient was located at Home: [address] [place] [address] [place] [address].  The provider was located at Home (Appt Dept State): [place].    Note: not billable if this call serves to triage the patient into an appointment for the relevant concern  Yes, I confirm.   Stanford Saleh is a 54 y.o. male evaluated via telephone on 11/21/2024 for Follow-Up from Hospital (Would like to be released to drive/Allergic reaction to Lipitor/Finished Plavix)  .        NENA Espinosa       HPI:     Patient was seen recently by our group on 10/29/24 for Rt side weakness. He was outside window for TNK. He had presented 10 days prior with similar sx and received TNK then- MRI that admission was negative for stroke. 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 CLAUDIA evaluated this and felt this was incidental. CTA with no significant stenosis. Echo unrevealing. . A1c 5.6     He was d/c on DAPT and lipitor 80 mg.     Has been doing well- feels mostly back to baseline. Does fatigue quicker and notices speech will be slightly slurred if he gets too excited and talking too fast. Weakness has improved. Working on fine

## 2024-11-25 ENCOUNTER — TREATMENT (OUTPATIENT)
Dept: OCCUPATIONAL THERAPY | Age: 54
End: 2024-11-25

## 2024-11-25 DIAGNOSIS — I63.81 CEREBROVASCULAR ACCIDENT (CVA) OF LEFT BASAL GANGLIA (HCC): Primary | ICD-10-CM

## 2024-11-25 NOTE — PROGRESS NOTES
reported    Subjective: Pt reports my arm is working some better, progress felt. .      Objective:    Updated POC to be completed by 6 th visit.    INTERVENTION: COMPLETED: SPECIFICS/COMMENTS:   Modality:     RUE x Fluido 10 mins with AROM exercises completed while monitored to assist with decreasing stiffness and increasing soft tissue elasticity.         AROM:     RUE X  x Towel scrunches x10  Small peg activity  5 min          AAROM:               PROM/Stretching:               Scar Mass/Edema Control:               Strengthening:     RUE X  X  x 2# shoulder flex/abd   15x2  to 90 degrees.  UBE  3.0 resistance  10 min, 5 ea direction.  Green flexbar  bend and twist  15x2        Other/Fine motor tasks:     RUE x Completed several fine motor tasks to focus on working intrinsic muscles and increasing coordination + HEP          Assessment/Comments: Good tolerance with exercises. Strength and coordination is progressing well. Will cont to progress as tolerated.         -Rehab Potential: Good   -Patient Response to Treatment: Pt tolerated all activities well    Patient. Education:  [] Plans/Goals, Risks/Benefits discussed  [] Home exercise program  Method of Education: [x] Verbal  [x] Demo  [] Written  Comprehension of Education:  [x] Verbalizes understanding.  [x] Demonstrates understanding.  [] Needs Review.  [] Demonstrates/verbalizes understanding of HEP/Ed previously given.      Time In: 9:45 am            Time Out: 10:40 am             CODE  Minutes  Units   42542 Fluidotherapy 10 1   48648 Paraffin     87299 Ultrasound     02884 Electrical Stim - Attended     13885 Iontophoresis     34051 Therapeutic Ex 15 1   14959 Therapeutic Activity 30 2   00411 Neuromuscular Re-Ed     06821 Manual Therapy     84803 ADL/COMP Tech Train     36028 Orthotic Management/Training      Other                 Total  55 4       Plan: OT 1x/week for 6-8 sessions    [x]  Continues Plan of care with focus on  improve functional use of

## 2024-12-02 ENCOUNTER — TREATMENT (OUTPATIENT)
Dept: OCCUPATIONAL THERAPY | Age: 54
End: 2024-12-02
Payer: COMMERCIAL

## 2024-12-02 ENCOUNTER — TREATMENT (OUTPATIENT)
Dept: PHYSICAL THERAPY | Age: 54
End: 2024-12-02
Payer: COMMERCIAL

## 2024-12-02 DIAGNOSIS — I63.81 CEREBROVASCULAR ACCIDENT (CVA) OF LEFT BASAL GANGLIA (HCC): Primary | ICD-10-CM

## 2024-12-02 PROCEDURE — 97110 THERAPEUTIC EXERCISES: CPT

## 2024-12-02 PROCEDURE — 97530 THERAPEUTIC ACTIVITIES: CPT

## 2024-12-02 NOTE — PROGRESS NOTES
OCCUPATIONAL THERAPY DAILY NOTE  [facility] PHYSICIANS [place] SPECIALTY CARE [facility] OCCUPATIONAL THERAPY  [address]  [address] FLO[address]  [facility] [address]  Dept: [phone]  Loc: [phone]   [facility] OT Fax: [phone]      Date:  2024  Initial Evaluation Date: 2024   Evaluating Therapist: MASON Grissom    Patient Name:  Stanford Saleh    :  1970    Restrictions/Precautions:  none, low fall risk  Diagnosis:  I63.81 (ICD-10-CM) - Cerebrovascular accident (CVA) of left basal ganglia (HCC)                                                               Date of Surgery/Injury: 10/29/2024 ( CVA a week prior to 10/18/2024)     Insurance/Certification information:  [facility]  Plan of care signed (Y/N): N  Visit# / total visits:  / 6-8     Referring Practitioner:  Leighton Garay MD   Specific Practitioner Orders: Eval and treat    OT PLAN OF CARE   OT POC based on physician orders, patient diagnosis and results of clinical assessment     Frequency/Duration:1x / week for 6-8 visits.   Certification period From: 2024 To: 2024    GOALS (Long term same as Short term):  1) Patient will demonstrate good understanding of home program (exercises/activities/diagnosis/prognosis/goals) with good accuracy.   2) Patient will demonstrate increased /pinch strength of at least 10 / 3-5 pinch pounds of their R hand.   3) Increase in fine motor function as evidenced by decreased time to complete 9-hole peg test and/or MRMT test by at least 5-10 seconds with  RUE in order to complete tying his shoes and writing his name.   4) Patient will report ADL functions as Mod I/I using RUE.   5) Patient will demonstrate improved functional activity tolerance from fair to good + for ADL/IADL completion.  6) Patient will decrease QuickDASH score to 5% or less for increased participation in daily functional activities.   TODAY'S TREATMENT     Pain Level: no pain

## 2024-12-02 NOTE — PROGRESS NOTES
[facility]          Phone: [phone] Fax: [phone]    Physical Therapy Daily Treatment Note  Date:  2024    Patient Name:  Stanford Saleh    :  1970  MRN: [id number]    Evaluating Therapist:  Maggie Downey, [place] [id number]    Restrictions/Precautions:    Diagnosis:     Diagnosis Orders   1. Cerebrovascular accident (CVA) of left basal ganglia (HCC)          Treatment Diagnosis:    Insurance/Certification information:  [facility]  Referring Physician:  Leighton Garay MD  Plan of care signed (Y/N):    Visit# / total visits:  3/3-  Pain level: 3/10   Time In:  1100  Time Out:  113    Subjective:  pt had no new reports    Exercises:  Exercise/Equipment Resistance/Repetitions Other comments     stepper 10 minutes Level 4     SLR 2# x 15 reps      Sidelying hip abd 2# x 15 reps      bridges 3 sec x 10 reps      Tandem stance 30 sec x 2 reps each side Blue balance pad     Tandem gait // bars, 3 laps fwd, 3 laps bwd      Agility ladder  R/back R/ fwd  L/back/L/fwd X 3 laps                                                                                              Other Therapeutic Activities:  pt balance improving , no LOB w/ dynamic higher level balance activities noted this date. Mild fatigue reported as pt had OT session just prior to PT session.     Home Exercise Program:  11/15/24 - SLR, sidelying hip abd    Manual Treatments:  N/A    Modalities:  N/A     Time-in Time-out Total Time   65033  Evaluation Low Complexity      70699  Evaluation Med Complexity      63947  Evaluation High Complexity      60001  Ther Ex 1100 1138 38   88463  Neuro Re-ed        75725  Ther Activities        08099  Manual Therapy       53585  E-stim       96473  Ultrasound            Session 1100 1138 38       Treatment/Activity Tolerance:  [x] Patient tolerated treatment well [] Patient limited by fatigue  [] Patient limited by pain  [] Patient limited by other medical

## 2024-12-03 ENCOUNTER — TELEPHONE (OUTPATIENT)
Dept: NEUROLOGY | Age: 54
End: 2024-12-03

## 2024-12-03 NOTE — TELEPHONE ENCOUNTER
Pts employer is requesting a return to work excuse to be sent to his place of employment.   It can be faxed to [phone]    He said that he returned to work on 11/26

## 2024-12-09 ENCOUNTER — TREATMENT (OUTPATIENT)
Dept: PHYSICAL THERAPY | Age: 54
End: 2024-12-09
Payer: COMMERCIAL

## 2024-12-09 ENCOUNTER — TREATMENT (OUTPATIENT)
Dept: OCCUPATIONAL THERAPY | Age: 54
End: 2024-12-09
Payer: COMMERCIAL

## 2024-12-09 DIAGNOSIS — I63.81 CEREBROVASCULAR ACCIDENT (CVA) OF LEFT BASAL GANGLIA (HCC): Primary | ICD-10-CM

## 2024-12-09 PROCEDURE — 97110 THERAPEUTIC EXERCISES: CPT | Performed by: OCCUPATIONAL THERAPIST

## 2024-12-09 PROCEDURE — 97110 THERAPEUTIC EXERCISES: CPT

## 2024-12-09 PROCEDURE — 97530 THERAPEUTIC ACTIVITIES: CPT | Performed by: OCCUPATIONAL THERAPIST

## 2024-12-09 NOTE — PROGRESS NOTES
OCCUPATIONAL THERAPY DAILY NOTE  [facility] PHYSICIANS [place] SPECIALTY CARE [facility] OCCUPATIONAL THERAPY  [address]  [address]LO[address]  [facility] [address]  Dept: [phone]  Loc: [phone]   [facility] OT Fax: [phone]      Date:  2024  Initial Evaluation Date: 2024   Evaluating Therapist: Hipolito Abdi OT    Patient Name:  Stanford Saleh    :  1970    Restrictions/Precautions:  none, low fall risk  Diagnosis:  I63.81 (ICD-10-CM) - Cerebrovascular accident (CVA) of left basal ganglia (HCC)                                                               Date of Surgery/Injury: 10/29/2024 ( CVA a week prior to 10/18/2024)     Insurance/Certification information:  [facility]  Plan of care signed (Y/N): N  Visit# / total visits:  / 6-8     Referring Practitioner:  Leighton Garay MD   Specific Practitioner Orders: Eval and treat    OT PLAN OF CARE   OT POC based on physician orders, patient diagnosis and results of clinical assessment     Frequency/Duration:1x / week for 6-8 visits.   Certification period From: 2024 To: 2024    GOALS (Long term same as Short term):  1) Patient will demonstrate good understanding of home program (exercises/activities/diagnosis/prognosis/goals) with good accuracy.   2) Patient will demonstrate increased /pinch strength of at least 10 / 3-5 pinch pounds of their R hand.   3) Increase in fine motor function as evidenced by decreased time to complete 9-hole peg test and/or MRMT test by at least 5-10 seconds with  RUE in order to complete tying his shoes and writing his name.   4) Patient will report ADL functions as Mod I/I using RUE.   5) Patient will demonstrate improved functional activity tolerance from fair to good + for ADL/IADL completion.  6) Patient will decrease QuickDASH score to 5% or less for increased participation in daily functional activities.   TODAY'S TREATMENT     Pain Level: no pain

## 2024-12-09 NOTE — PROGRESS NOTES
[facility]          Phone: [phone] Fax: [phone]    Physical Therapy Daily Treatment Note  Date:  2024    Patient Name:  Stanford Saleh    :  1970  MRN: [id number]    Evaluating Therapist:  Maggie Downey, PT [id number]    Restrictions/Precautions:    Diagnosis:     Diagnosis Orders   1. Cerebrovascular accident (CVA) of left basal ganglia (HCC)          Treatment Diagnosis:    Insurance/Certification information:  [facility]  Referring Physician:  Leighton Garay MD  Plan of care signed (Y/N):    Visit# / total visits:  4/3-  Pain level: 3/10   Time In:  1050  Time Out:  1121    Subjective:  pt fatigued, helped Son move. Pt reported he feels stronger. Pt indep w/ HEP pt demonstrated improved balance to therapist with tandem stance eyes open no sway no LOB, eyes closed slight sway no LOB    Exercises:  Exercise/Equipment Resistance/Repetitions Other comments     stepper 10 minutes Level 4     SLR 2# x 15 reps      Sidelying hip abd 2# x 15 reps      bridges 3 sec x 10 reps      Tandem stance 30 sec x 2 reps each side Blue balance pad     Tandem gait // bars, 3 laps fwd, 3 laps bwd      Agility ladder  R/back R/ fwd  L/back/L/fwd X 3 laps              R hip 5/5 throughout       DGI                                                                         Other Therapeutic Activities:  pt balance improving , no LOB w/ dynamic higher level balance activities noted this date. Mild fatigue reported as pt had OT session just prior to PT session.     Home Exercise Program:  11/15/24 - SLR, sidelying hip abd 24 bridges, tandem stance, tandem gait    Manual Treatments:  N/A    Modalities:  N/A     Time-in Time-out Total Time   13424  Evaluation Low Complexity      90076  Evaluation Med Complexity      98750  Evaluation High Complexity      73948  Ther Ex 1050 1121 31   76807  Neuro Re-ed        25722  Ther Activities        96561  Manual Therapy

## 2024-12-10 NOTE — PROGRESS NOTES
[facility]                Phone: [phone] Fax: [phone]    Physical Therapy  Outpatient Discharge Summary     Date:  12/10/2024    Patient Name:  Stanford Saleh    :  1970  MRN: [id number]    DIAGNOSIS:     Diagnosis Orders   1. Cerebrovascular accident (CVA) of left basal ganglia (HCC)          REFERRING PHYSICIAN:  Leighton Garay MD    ATTENDANCE:  Pt has attended 4 of 4 scheduled treatments from 11/15/24 to 24.  TREATMENTS RECEIVED:  strength training, neuromuscular reeducation, education in a HEP     INITIAL STATUS:  Strength right hip 4/5  Decreased balance - pt demonstrating a fall to the right with EO and EC tandem stance  DGI:     FINAL STATUS:  Strength right hip 5/5 throughout   Pt able to maintain tandem stance with EO and EC independently without fall  DGI:   Pt is independent with HEP    GOALS:  4 out of 4 Long Term Goals were obtained.    LONG TERM GOALS NOT OBTAINED/REASON:  N/A    PATIENT GOALS:  pain relief, improved balance    REASON FOR DISCHARGE:  Pt has met goals    PATIENT EDUCATION/INSTRUCTIONS:  Pt educated in strength and balance activities for HEP.    RECOMMENDATIONS:  Discharge to HEP        Thank you for the opportunity to work with your patient. If you have questions or comments, please feel free to contact me by phone or fax.      Electronically Signed by: Maggie Downey PT, [id number]  12/10/2024

## 2024-12-15 DIAGNOSIS — G47.33 OSA (OBSTRUCTIVE SLEEP APNEA): Primary | ICD-10-CM

## 2024-12-16 ENCOUNTER — TREATMENT (OUTPATIENT)
Dept: OCCUPATIONAL THERAPY | Age: 54
End: 2024-12-16
Payer: COMMERCIAL

## 2024-12-16 DIAGNOSIS — I63.81 CEREBROVASCULAR ACCIDENT (CVA) OF LEFT BASAL GANGLIA (HCC): Primary | ICD-10-CM

## 2024-12-16 PROCEDURE — 97110 THERAPEUTIC EXERCISES: CPT | Performed by: OCCUPATIONAL THERAPIST

## 2024-12-16 PROCEDURE — 97530 THERAPEUTIC ACTIVITIES: CPT | Performed by: OCCUPATIONAL THERAPIST

## 2024-12-16 NOTE — PROGRESS NOTES
Out: 10:40 am             CODE  Minutes  Units   15512 Fluidotherapy     87081 Paraffin     84358 Ultrasound     32294 Electrical Stim - Attended     95875 Iontophoresis     52989 Therapeutic Ex 35 2   74152 Therapeutic Activity 10 1   80694 Neuromuscular Re-Ed     25763 Manual Therapy     73454 ADL/COMP Tech Train     20664 Orthotic Management/Training      Other                 Total  45 3       Plan: OT 1x/week for 6-8 sessions    [x]  Continues Plan of care with focus on  improve functional use of the RUE by increasing strength, endurance and fine motor coordination. : Treatment covered based on POC and graduated to patient's progress. Pt education continues at each visit to obtain maximum benefits from skilled OT intervention.  []  Alter Plan of care:   []  Discharge:      Hipolito Abdi OT/DANIEL, T [id number]

## 2024-12-18 ENCOUNTER — TELEPHONE (OUTPATIENT)
Dept: SLEEP MEDICINE | Age: 54
End: 2024-12-18

## 2024-12-18 NOTE — TELEPHONE ENCOUNTER
----- Message from Dr. Lydia Dumont DO sent at 12/15/2024  1:14 PM EST -----  Got an email from Nestor brown PCP wants us to order CPAP. Some confusion regarding management/tx. I will put CPAP orders and can you pls call PCPs office to have them fax referral to sleep clinic for CPAP management as we have an open sleep lab so we don't automatically see the pts/manage CPAP unless there is also a referral placed to sleep clinic.

## 2024-12-18 NOTE — TELEPHONE ENCOUNTER
Call to Dr Camejo's office to get a referral faxed to us for patient.  said he is not in today and we need to call tomorrow.  Call to patient, discussed results and tx recommendations for a CPAP. Patient agreeable to orders being sent to [facility]. Scheduled compliance

## 2024-12-19 ENCOUNTER — TREATMENT (OUTPATIENT)
Dept: OCCUPATIONAL THERAPY | Age: 54
End: 2024-12-19

## 2024-12-19 DIAGNOSIS — I63.81 CEREBROVASCULAR ACCIDENT (CVA) OF LEFT BASAL GANGLIA (HCC): Primary | ICD-10-CM

## 2024-12-19 NOTE — PROGRESS NOTES
shoulder pain and substitution movements. Patient actively engaged in HEP with light tband provided to address shoulder/scapular deficits today        -Rehab Potential: Good   -Patient Response to Treatment: Pt tolerated all activities well    Patient. Education:  [] Plans/Goals, Risks/Benefits discussed  [x] Home exercise program  Method of Education: [x] Verbal  [x] Demo  [] Written  Comprehension of Education:  [x] Verbalizes understanding.  [x] Demonstrates understanding.  [] Needs Review.  [] Demonstrates/verbalizes understanding of HEP/Ed previously given.      Time In: 9 am            Time Out: 10 am             CODE  Minutes  Units   77842 Fluidotherapy     92964 Paraffin     88455 Ultrasound     10039 Electrical Stim - Attended     03319 Iontophoresis     10847 Therapeutic Ex 25 2   32885 Therapeutic Activity 20 1   25664 Neuromuscular Re-Ed 10 1   05289 Manual Therapy     84721 ADL/COMP Tech Train     54246 Orthotic Management/Training      Other                 Total  55 4       Plan: OT 1x/week for 6-8 sessions    [x]  Continues Plan of care with focus on  improve functional use of the RUE by increasing strength, endurance and fine motor coordination. : Treatment covered based on POC and graduated to patient's progress. Pt education continues at each visit to obtain maximum benefits from skilled OT intervention.  []  Alter Plan of care:   []  Discharge:      MASON Grissom/DANIEL, [id number]

## 2024-12-23 ENCOUNTER — TREATMENT (OUTPATIENT)
Dept: OCCUPATIONAL THERAPY | Age: 54
End: 2024-12-23
Payer: COMMERCIAL

## 2024-12-23 DIAGNOSIS — I63.81 CEREBROVASCULAR ACCIDENT (CVA) OF LEFT BASAL GANGLIA (HCC): Primary | ICD-10-CM

## 2024-12-23 PROCEDURE — 97112 NEUROMUSCULAR REEDUCATION: CPT

## 2024-12-23 PROCEDURE — 97110 THERAPEUTIC EXERCISES: CPT

## 2024-12-23 PROCEDURE — 97530 THERAPEUTIC ACTIVITIES: CPT

## 2024-12-23 NOTE — PROGRESS NOTES
OCCUPATIONAL THERAPY DAILY NOTE  [facility] PHYSICIANS [place] SPECIALTY CARE [facility] OCCUPATIONAL THERAPY  [address]  [address]LO[address]  [facility] [address]  Dept: [phone]  Loc: [phone]   [facility] OT Fax: [phone]      Date:  2024  Initial Evaluation Date: 2024   Evaluating Therapist: MASON Grissom    Patient Name:  Stanford Saleh    :  1970    Restrictions/Precautions:  none, low fall risk  Diagnosis:  I63.81 (ICD-10-CM) - Cerebrovascular accident (CVA) of left basal ganglia (HCC)                                                               Date of Surgery/Injury: 10/29/2024 ( CVA a week prior to 10/18/2024)     Insurance/Certification information:  [facility]  Plan of care signed (Y/N): N  Visit# / total visits:  / 6-8     Referring Practitioner:  Leighton Garay MD   Specific Practitioner Orders: Eval and treat    OT PLAN OF CARE   OT POC based on physician orders, patient diagnosis and results of clinical assessment     Frequency/Duration:1x / week for 6-8 visits.   Certification period From: 2024 To: 2024    GOALS (Long term same as Short term):  1) Patient will demonstrate good understanding of home program (exercises/activities/diagnosis/prognosis/goals) with good accuracy.   2) Patient will demonstrate increased /pinch strength of at least 10 / 3-5 pinch pounds of their R hand.   3) Increase in fine motor function as evidenced by decreased time to complete 9-hole peg test and/or MRMT test by at least 5-10 seconds with  RUE in order to complete tying his shoes and writing his name.   4) Patient will report ADL functions as Mod I/I using RUE.   5) Patient will demonstrate improved functional activity tolerance from fair to good + for ADL/IADL completion.  6) Patient will decrease QuickDASH score to 5% or less for increased participation in daily functional activities.   TODAY'S TREATMENT     Pain Level: no pain

## 2024-12-30 ENCOUNTER — TREATMENT (OUTPATIENT)
Dept: OCCUPATIONAL THERAPY | Age: 54
End: 2024-12-30
Payer: COMMERCIAL

## 2024-12-30 DIAGNOSIS — I63.81 CEREBROVASCULAR ACCIDENT (CVA) OF LEFT BASAL GANGLIA (HCC): Primary | ICD-10-CM

## 2024-12-30 PROCEDURE — 97530 THERAPEUTIC ACTIVITIES: CPT | Performed by: OCCUPATIONAL THERAPIST

## 2024-12-30 NOTE — PROGRESS NOTES
OCCUPATIONAL THERAPY  PROGRESS UPDATE  [facility] PHYSICIANS [place] [facility] OCCUPATIONAL THERAPY  [address]  2ND FLOOR  [facility] [address]  Dept: [phone]  Loc: [phone]   [facility] OT Fax: [phone]      Date:  2024  Initial Evaluation Date: 2024   Evaluating Therapist: Xin Culp OT    Patient Name:  Stanford Saleh    :  1970    Restrictions/Precautions:  none, low fall risk  Diagnosis:  I63.81 (ICD-10-CM) - Cerebrovascular accident (CVA) of left basal ganglia (HCC)                                                               Date of Surgery/Injury: 10/29/2024 ( CVA a week prior to 10/18/2024)     Insurance/Certification information:  [facility]  Plan of care signed (Y/N): N  Visit# / total visits: 8 / 6-8     Referring Practitioner:  Leighton Garay MD   Specific Practitioner Orders: Eval and treat    OT PLAN OF CARE   OT POC based on physician orders, patient diagnosis and results of clinical assessment     Frequency/Duration:1x / week for 6-8 visits.   Certification period From: 2024 To: 2024    GOALS (Long term same as Short term):  1) Patient will demonstrate good understanding of home program (exercises/activities/diagnosis/prognosis/goals) with good accuracy.   Met.   2) Patient will demonstrate increased /pinch strength of at least 10 / 3-5 pinch pounds of their R hand.   Met, Pt has increased  by 10# and pinch by atleast 3 # since initial evaluation  3) Increase in fine motor function as evidenced by decreased time to complete 9-hole peg test and/or MRMT test by at least 5-10 seconds with  RUE in order to complete tying his shoes and writing his name. Not met, Pt did improve however only by 3 seconds  4) Patient will report ADL functions as Mod I/I using RUE.   Met, Pt reports no difficulty with ADL tasks.   5) Patient will demonstrate improved functional activity tolerance from fair to good + for

## 2025-01-15 NOTE — TELEPHONE ENCOUNTER
Did not receive referral, called office again and they will have him send the referral, he is out today so he will send tomorrow

## 2025-03-18 ENCOUNTER — APPOINTMENT (OUTPATIENT)
Dept: CT IMAGING | Age: 55
End: 2025-03-18
Payer: COMMERCIAL

## 2025-03-18 ENCOUNTER — HOSPITAL ENCOUNTER (EMERGENCY)
Age: 55
Discharge: LEFT BEFORE DISCHARGE | End: 2025-03-18
Attending: EMERGENCY MEDICINE
Payer: COMMERCIAL

## 2025-03-18 ENCOUNTER — APPOINTMENT (OUTPATIENT)
Dept: GENERAL RADIOLOGY | Age: 55
End: 2025-03-18
Payer: COMMERCIAL

## 2025-03-18 VITALS
SYSTOLIC BLOOD PRESSURE: 117 MMHG | HEART RATE: 85 BPM | RESPIRATION RATE: 20 BRPM | DIASTOLIC BLOOD PRESSURE: 66 MMHG | TEMPERATURE: 97.6 F | OXYGEN SATURATION: 99 %

## 2025-03-18 DIAGNOSIS — Z53.29 LEFT AGAINST MEDICAL ADVICE: ICD-10-CM

## 2025-03-18 DIAGNOSIS — R55 SYNCOPE AND COLLAPSE: Primary | ICD-10-CM

## 2025-03-18 DIAGNOSIS — E87.5 HYPERKALEMIA: ICD-10-CM

## 2025-03-18 LAB
ALBUMIN SERPL-MCNC: 4.8 G/DL (ref 3.5–5.2)
ALP SERPL-CCNC: 82 U/L (ref 40–129)
ALT SERPL-CCNC: 18 U/L (ref 0–40)
ANION GAP SERPL CALCULATED.3IONS-SCNC: 16 MMOL/L (ref 7–16)
AST SERPL-CCNC: 16 U/L (ref 0–39)
BASOPHILS # BLD: 0 K/UL (ref 0–0.2)
BASOPHILS NFR BLD: 0 % (ref 0–2)
BILIRUB SERPL-MCNC: 0.4 MG/DL (ref 0–1.2)
BUN SERPL-MCNC: 14 MG/DL (ref 6–20)
CALCIUM SERPL-MCNC: 9 MG/DL (ref 8.6–10.2)
CHLORIDE SERPL-SCNC: 97 MMOL/L (ref 98–107)
CO2 SERPL-SCNC: 19 MMOL/L (ref 22–29)
CREAT SERPL-MCNC: 1 MG/DL (ref 0.7–1.2)
EOSINOPHIL # BLD: 0 K/UL (ref 0.05–0.5)
EOSINOPHILS RELATIVE PERCENT: 0 % (ref 0–6)
ERYTHROCYTE [DISTWIDTH] IN BLOOD BY AUTOMATED COUNT: 12.9 % (ref 11.5–15)
GFR, ESTIMATED: >90 ML/MIN/1.73M2
GLUCOSE SERPL-MCNC: 98 MG/DL (ref 74–99)
HCT VFR BLD AUTO: 47.1 % (ref 37–54)
HGB BLD-MCNC: 15.8 G/DL (ref 12.5–16.5)
LACTATE BLDV-SCNC: 1.2 MMOL/L (ref 0.5–2.2)
LYMPHOCYTES NFR BLD: 0.27 K/UL (ref 1.5–4)
LYMPHOCYTES RELATIVE PERCENT: 3 % (ref 20–42)
MAGNESIUM SERPL-MCNC: 2 MG/DL (ref 1.6–2.6)
MCH RBC QN AUTO: 30.4 PG (ref 26–35)
MCHC RBC AUTO-ENTMCNC: 33.5 G/DL (ref 32–34.5)
MCV RBC AUTO: 90.6 FL (ref 80–99.9)
MONOCYTES NFR BLD: 0.36 K/UL (ref 0.1–0.95)
MONOCYTES NFR BLD: 4 % (ref 2–12)
NEUTROPHILS NFR BLD: 94 % (ref 43–80)
NEUTS SEG NFR BLD: 9.76 K/UL (ref 1.8–7.3)
PLATELET # BLD AUTO: 255 K/UL (ref 130–450)
PMV BLD AUTO: 9.9 FL (ref 7–12)
POTASSIUM SERPL-SCNC: 5.5 MMOL/L (ref 3.5–5)
PROT SERPL-MCNC: 6.9 G/DL (ref 6.4–8.3)
RBC # BLD AUTO: 5.2 M/UL (ref 3.8–5.8)
RBC # BLD: NORMAL 10*6/UL
SODIUM SERPL-SCNC: 132 MMOL/L (ref 132–146)
TROPONIN I SERPL HS-MCNC: 7 NG/L (ref 0–11)
WBC OTHER # BLD: 10.4 K/UL (ref 4.5–11.5)

## 2025-03-18 PROCEDURE — 80053 COMPREHEN METABOLIC PANEL: CPT

## 2025-03-18 PROCEDURE — 99285 EMERGENCY DEPT VISIT HI MDM: CPT

## 2025-03-18 PROCEDURE — 93005 ELECTROCARDIOGRAM TRACING: CPT

## 2025-03-18 PROCEDURE — 83735 ASSAY OF MAGNESIUM: CPT

## 2025-03-18 PROCEDURE — 83605 ASSAY OF LACTIC ACID: CPT

## 2025-03-18 PROCEDURE — 85025 COMPLETE CBC W/AUTO DIFF WBC: CPT

## 2025-03-18 PROCEDURE — 2580000003 HC RX 258

## 2025-03-18 PROCEDURE — 84484 ASSAY OF TROPONIN QUANT: CPT

## 2025-03-18 PROCEDURE — 71045 X-RAY EXAM CHEST 1 VIEW: CPT

## 2025-03-18 PROCEDURE — 70450 CT HEAD/BRAIN W/O DYE: CPT

## 2025-03-18 RX ORDER — EZETIMIBE 10 MG/1
10 TABLET ORAL DAILY
COMMUNITY
Start: 2025-02-02

## 2025-03-18 RX ORDER — 0.9 % SODIUM CHLORIDE 0.9 %
1000 INTRAVENOUS SOLUTION INTRAVENOUS ONCE
Status: COMPLETED | OUTPATIENT
Start: 2025-03-18 | End: 2025-03-18

## 2025-03-18 RX ADMIN — SODIUM CHLORIDE 1000 ML: 9 INJECTION, SOLUTION INTRAVENOUS at 12:00

## 2025-03-18 NOTE — ED PROVIDER NOTES
RESULTS     I have personally interpreted all laboratory, EKG, and imaging results for this patient. Results are listed below.     LABS:  Labs Reviewed   CBC WITH AUTO DIFFERENTIAL - Abnormal; Notable for the following components:       Result Value    Neutrophils % 94 (*)     Lymphocytes % 3 (*)     Neutrophils Absolute 9.76 (*)     Lymphocytes Absolute 0.27 (*)     Eosinophils Absolute 0.00 (*)     All other components within normal limits   COMPREHENSIVE METABOLIC PANEL - Abnormal; Notable for the following components:    Potassium 5.5 (*)     Chloride 97 (*)     CO2 19 (*)     All other components within normal limits   TROPONIN   LACTIC ACID   MAGNESIUM       As interpreted by me, the above displayed labs are abnormal. All other labs obtained during this visit were within normal range or not returned as of this dictation.    EKG obtained during ED visit is noted in the ED course.    RADIOLOGY:  Non-plain film images such as CT, Ultrasound and MRI are read by the radiologist. Plain radiographic images are visualized and preliminarily interpreted by the ED Provider as mentioned in the MDM section below.    Interpretation per the Radiologist below, if available at the time of this note:    CT HEAD WO CONTRAST   Final Result   Right maxillary sinusitis.         XR CHEST PORTABLE   Final Result   No acute cardiopulmonary disease.           No results found.    No results found.    MDM     History is obtained from patient.  55 y.o. male presented to the emergency department today because he had concerns including Loss of Consciousness (This AM- pt states \"didn't feel right\" and sat down then awoke to family giving stimuli- pt states possibly convulsing- pt states recent CVA).  Initial vital signs are stable.  Physical exam shows a patient in no distress.  Patient moving all extremities equally upon arrival, no focal neurologic deficit.  No tongue biting or evidence of urinary incontinence.  Differential diagnoses

## 2025-03-20 NOTE — PROGRESS NOTES
[facility] Sleep Medicine    Patient Name: Stanford Saleh  Age: 55 y.o.   : 1970  Date of Visit: 3/20/25    Assessment and Plan:     1. SCOTT (obstructive sleep apnea)  - Adjust CPAP pressures to 5-15 cmH2O.   - F-U 3 months  - Continue excellent use.    2. Snoring  - Chronic, stable.      Return in about 3 months (around 2025).    History:    HPI   Stanford Saleh is a 55 y.o. male with  has a past medical history of Cerebral artery occlusion with cerebral infarction (HCC). who presents as a new patient to Sleep Clinic, referred by Dr. Last ref. provider found, for Sleep Apnea  .      - Doing well with CPAP  - Does not derive major benefit from therapy, however.  - AutoCPAP was set to 5-7 cmH2O.   - Data analysis reveals majority of the night spent at the higher limit on the CPAP    PMH:  Past Medical History:   Diagnosis Date    Cerebral artery occlusion with cerebral infarction (HCC)         PSH:  Past Surgical History:   Procedure Laterality Date    APPENDECTOMY      HERNIA REPAIR          Soc Hx:  Social History     Tobacco Use    Smoking status: Every Day     Types: Cigarettes     Passive exposure: Current   Vaping Use    Vaping status: Never Used        Fam Hx:  No family history on file.     Current Outpatient Medications   Medication Sig Dispense Refill    ezetimibe (ZETIA) 10 MG tablet Take 1 tablet by mouth daily      amLODIPine (NORVASC) 5 MG tablet Take 1 tablet by mouth daily 30 tablet 3    aspirin 81 MG chewable tablet Take 1 tablet by mouth daily 30 tablet 3    loratadine (ALAVERT) 10 MG dissolvable tablet Take 1 tablet by mouth daily       No current facility-administered medications for this visit.        Objective:     /76 (BP Site: Right Upper Arm, Patient Position: Sitting, BP Cuff Size: Large Adult)   Pulse 76   Temp 98 °F (36.7 °C)   Resp 14   Ht 1.676 m (5' 6\")   Wt 69.6 kg (153 lb 7 oz)   SpO2 96%   BMI 24.77 kg/m²      Physical Exam  HENT:      Nose: Nose normal.

## 2025-03-21 LAB
EKG ATRIAL RATE: 71 BPM
EKG P AXIS: 60 DEGREES
EKG P-R INTERVAL: 132 MS
EKG Q-T INTERVAL: 362 MS
EKG QRS DURATION: 100 MS
EKG QTC CALCULATION (BAZETT): 393 MS
EKG R AXIS: 55 DEGREES
EKG T AXIS: 49 DEGREES
EKG VENTRICULAR RATE: 71 BPM

## 2025-03-21 PROCEDURE — 93010 ELECTROCARDIOGRAM REPORT: CPT | Performed by: INTERNAL MEDICINE

## 2025-03-24 ENCOUNTER — OFFICE VISIT (OUTPATIENT)
Dept: SLEEP MEDICINE | Age: 55
End: 2025-03-24
Payer: COMMERCIAL

## 2025-03-24 VITALS
HEIGHT: 66 IN | WEIGHT: 153.44 LBS | DIASTOLIC BLOOD PRESSURE: 76 MMHG | RESPIRATION RATE: 14 BRPM | HEART RATE: 76 BPM | TEMPERATURE: 98 F | BODY MASS INDEX: 24.66 KG/M2 | SYSTOLIC BLOOD PRESSURE: 120 MMHG | OXYGEN SATURATION: 96 %

## 2025-03-24 DIAGNOSIS — G47.33 OSA (OBSTRUCTIVE SLEEP APNEA): Primary | ICD-10-CM

## 2025-03-24 DIAGNOSIS — R06.83 SNORING: ICD-10-CM

## 2025-03-24 PROCEDURE — 99204 OFFICE O/P NEW MOD 45 MIN: CPT | Performed by: STUDENT IN AN ORGANIZED HEALTH CARE EDUCATION/TRAINING PROGRAM

## 2025-03-24 PROCEDURE — G8420 CALC BMI NORM PARAMETERS: HCPCS | Performed by: STUDENT IN AN ORGANIZED HEALTH CARE EDUCATION/TRAINING PROGRAM

## 2025-03-24 PROCEDURE — G8427 DOCREV CUR MEDS BY ELIG CLIN: HCPCS | Performed by: STUDENT IN AN ORGANIZED HEALTH CARE EDUCATION/TRAINING PROGRAM

## 2025-03-24 PROCEDURE — 3017F COLORECTAL CA SCREEN DOC REV: CPT | Performed by: STUDENT IN AN ORGANIZED HEALTH CARE EDUCATION/TRAINING PROGRAM

## 2025-03-24 PROCEDURE — 4004F PT TOBACCO SCREEN RCVD TLK: CPT | Performed by: STUDENT IN AN ORGANIZED HEALTH CARE EDUCATION/TRAINING PROGRAM

## 2025-03-24 ASSESSMENT — SLEEP AND FATIGUE QUESTIONNAIRES
HOW LIKELY ARE YOU TO NOD OFF OR FALL ASLEEP WHILE SITTING QUIETLY AFTER LUNCH WITHOUT ALCOHOL: SLIGHT CHANCE OF DOZING
HOW LIKELY ARE YOU TO NOD OFF OR FALL ASLEEP WHILE SITTING INACTIVE IN A PUBLIC PLACE: SLIGHT CHANCE OF DOZING
HOW LIKELY ARE YOU TO NOD OFF OR FALL ASLEEP WHILE WATCHING TV: MODERATE CHANCE OF DOZING
HOW LIKELY ARE YOU TO NOD OFF OR FALL ASLEEP WHEN YOU ARE A PASSENGER IN A CAR FOR AN HOUR WITHOUT A BREAK: MODERATE CHANCE OF DOZING
HOW LIKELY ARE YOU TO NOD OFF OR FALL ASLEEP IN A CAR, WHILE STOPPED FOR A FEW MINUTES IN TRAFFIC: WOULD NEVER DOZE
HOW LIKELY ARE YOU TO NOD OFF OR FALL ASLEEP WHILE SITTING AND TALKING TO SOMEONE: WOULD NEVER DOZE
HOW LIKELY ARE YOU TO NOD OFF OR FALL ASLEEP WHILE SITTING AND READING: HIGH CHANCE OF DOZING
ESS TOTAL SCORE: 11
HOW LIKELY ARE YOU TO NOD OFF OR FALL ASLEEP WHILE LYING DOWN TO REST IN THE AFTERNOON WHEN CIRCUMSTANCES PERMIT: MODERATE CHANCE OF DOZING

## 2025-03-26 ENCOUNTER — OFFICE VISIT (OUTPATIENT)
Dept: NEUROLOGY | Age: 55
End: 2025-03-26
Payer: COMMERCIAL

## 2025-03-26 VITALS
BODY MASS INDEX: 24.59 KG/M2 | DIASTOLIC BLOOD PRESSURE: 72 MMHG | SYSTOLIC BLOOD PRESSURE: 110 MMHG | WEIGHT: 153 LBS | HEIGHT: 66 IN

## 2025-03-26 DIAGNOSIS — I63.81 CEREBROVASCULAR ACCIDENT (CVA) DUE TO OCCLUSION OF SMALL ARTERY (HCC): Primary | ICD-10-CM

## 2025-03-26 PROCEDURE — 4004F PT TOBACCO SCREEN RCVD TLK: CPT | Performed by: PHYSICIAN ASSISTANT

## 2025-03-26 PROCEDURE — G8427 DOCREV CUR MEDS BY ELIG CLIN: HCPCS | Performed by: PHYSICIAN ASSISTANT

## 2025-03-26 PROCEDURE — 99213 OFFICE O/P EST LOW 20 MIN: CPT | Performed by: PHYSICIAN ASSISTANT

## 2025-03-26 PROCEDURE — 3017F COLORECTAL CA SCREEN DOC REV: CPT | Performed by: PHYSICIAN ASSISTANT

## 2025-03-26 PROCEDURE — G8420 CALC BMI NORM PARAMETERS: HCPCS | Performed by: PHYSICIAN ASSISTANT

## 2025-03-26 NOTE — PROGRESS NOTES
[facility]  NEUROLOGY  Maria D GARCIA PA-C  Phone: [phone]  Fax: [phone]           Documentation:  I communicated with the patient and/or health care decision maker about recent hospitalization.   Details of this discussion including any medical advice provided: medications, stroke prevention    Total Time: minutes: 11-20 minutes    Stanford Saleh was evaluated through a synchronous (real-time) audio encounter. Patient identification was verified at the start of the visit. He (or guardian if applicable) is aware that this is a billable service, which includes applicable co-pays. This visit was conducted with the patient's (and/or legal guardian's) verbal consent. He has not had a related appointment within my department in the past 7 days or scheduled within the next 24 hours.   The patient was located at Home: [address] [place] [address] [place] [address].  The provider was located at Home (Appt Dept State): [place].    Note: not billable if this call serves to triage the patient into an appointment for the relevant concern  Yes, I confirm.   Stanford Saleh is a 55 y.o. male evaluated via telephone on 3/26/2025 for Neurologic Problem (CVA f/u, patient states he does experience brain fog since CVA 10/2024)  .        NENA Espinosa       HPI:     Patient was seen recently by our group on 10/29/24 for Rt side weakness. He was outside window for TNK. He had presented 10 days prior with similar sx and received TNK then- MRI that admission was negative for stroke. 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 CLAUDIA evaluated this and felt this was incidental. CTA with no significant stenosis. Echo unrevealing. . A1c 5.6     He was d/c on DAPT and lipitor 80 mg.     Has been doing well- feels mostly back to baseline. Does fatigue quicker and notices speech will be slightly slurred if he gets too excited and talking too fast. Weakness has improved. Working on fine finger motor

## 2025-03-26 NOTE — PROGRESS NOTES
[facility] Neurology    Date:  3/26/2025  Patient Name:  Stanford Saleh  YOB: 1970  MRN: [id number]     PCP:  Viktor Camejo III, DO   Referring:  No ref. provider found      Chief Complaint: follow up stroke    History obtained from: patient     Assessment    Left basal ganglia ischemic stroke mechanism small vessel from vascular risk factors     Plan  Continue ASA and statin   Continued CPAP compliance   Risk factor modification   Rash to lipitor-- on Zetia   Goal LDL < 70  Smoking cessation encouraged   RTO yearly or sooner prn         History of Present Illness:  Stanford Saleh is a 55 y.o.  male presenting for follow up of stroke.    Patient was seen recently by our group on 10/29/24 for Rt side weakness. He was outside window for TNK. He had presented 10 days prior with similar sx and received TNK then- MRI that admission was negative for stroke. 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 CLAUDIA evaluated this and felt this was incidental. CTA with no significant stenosis. Echo unrevealing. . A1c 5.6      He was d/c on DAPT and lipitor 80 mg.      Has been doing well- feels mostly back to baseline. Does fatigue quicker and notices speech will be slightly slurred if he gets too excited and talking too fast. Weakness has improved. Working on fine finger motor control with OT.     He did break out in a rash and just saw PCP. Lipitor was stopped. Plan is to recheck in a couple days and hopefully be able to try a different statin.      Sleep study showed severe SCOTT-- just had setting adjusted on CPAP this past week.     Continues to work on quitting smoking    Thinking feels foggy, but gradually improving.     Since last visit was seen in ED after syncopal episode. CT head was stable. Noted to have hyperkalemia. He left AMA. Hx of vasovagal syncope in the past     Medical History:   Past Medical History:   Diagnosis Date    Cerebral artery occlusion with

## 2025-06-25 NOTE — PROGRESS NOTES
Stanford Saleh, was evaluated through a synchronous (real-time) audio-video encounter. The patient (or guardian if applicable) is aware that this is a billable service, which includes applicable co-pays. This Virtual Visit was conducted with patient's (and/or legal guardian's) consent. Patient identification was verified, and a caregiver was present when appropriate.   The patient was located at Home: [address] [place] [address] [place] [address]  Provider was located at Facility (Appt Dept): [address] [facility]  [place] [address]  Confirm you are appropriately licensed, registered, or certified to deliver care in the state where the patient is located as indicated above. If you are not or unsure, please re-schedule the visit: Yes, I confirm.     Stanford Saleh (:  1970) is a Established patient, presenting virtually for evaluation of the following:      Below is the assessment and plan developed based on review of pertinent history, physical exam, labs, studies, and medications.     Assessment & Plan  SCOTT (obstructive sleep apnea)   Chronic, at goal (stable), continue current treatment plan   Contact [facility]. For mask supply change,.        Subjective       - Does derive benefit from the CPAP pressure adjustments  - His mask seems to be a little too small and he wants to scale up his size due to persistent leak    Objective   Patient-Reported Vitals  No data recorded     Physical Exam  [INSTRUCTIONS:  \"[x]\" Indicates a positive item  \"[]\" Indicates a negative item  -- DELETE ALL ITEMS NOT EXAMINED]    Constitutional: [x] Appears well-developed and well-nourished [x] No apparent distress      [] Abnormal -     Mental status: [x] Alert and awake  [x] Oriented to person/place/time [x] Able to follow commands    [] Abnormal -     Eyes:   EOM    [x]  Normal    [] Abnormal -   Sclera  [x]  Normal    [] Abnormal -          Discharge [x]  None visible   [] Abnormal -     HENT: [x] Normocephalic,

## 2025-06-26 ENCOUNTER — TELEMEDICINE (OUTPATIENT)
Dept: SLEEP MEDICINE | Age: 55
End: 2025-06-26
Payer: COMMERCIAL

## 2025-06-26 DIAGNOSIS — G47.33 OSA (OBSTRUCTIVE SLEEP APNEA): Primary | ICD-10-CM

## 2025-06-26 PROCEDURE — 99214 OFFICE O/P EST MOD 30 MIN: CPT | Performed by: STUDENT IN AN ORGANIZED HEALTH CARE EDUCATION/TRAINING PROGRAM
